# Patient Record
Sex: FEMALE | Race: BLACK OR AFRICAN AMERICAN | NOT HISPANIC OR LATINO | Employment: FULL TIME | URBAN - METROPOLITAN AREA
[De-identification: names, ages, dates, MRNs, and addresses within clinical notes are randomized per-mention and may not be internally consistent; named-entity substitution may affect disease eponyms.]

---

## 2020-02-25 PROBLEM — E66.01 MORBID OBESITY: Status: ACTIVE | Noted: 2017-11-02

## 2020-04-08 PROBLEM — Z79.4 TYPE 2 DIABETES MELLITUS WITH HYPERGLYCEMIA, WITH LONG-TERM CURRENT USE OF INSULIN: Status: ACTIVE | Noted: 2017-11-02

## 2020-04-08 PROBLEM — M79.661 PAIN AND SWELLING OF LOWER LEG, RIGHT: Status: ACTIVE | Noted: 2017-11-02

## 2020-04-08 PROBLEM — E11.65 TYPE 2 DIABETES MELLITUS WITH HYPERGLYCEMIA, WITH LONG-TERM CURRENT USE OF INSULIN: Status: ACTIVE | Noted: 2017-11-02

## 2020-04-08 PROBLEM — M79.89 PAIN AND SWELLING OF LOWER LEG, RIGHT: Status: ACTIVE | Noted: 2017-11-02

## 2020-04-08 PROBLEM — R76.8 RHEUMATOID FACTOR POSITIVE: Status: ACTIVE | Noted: 2017-11-02

## 2021-04-29 ENCOUNTER — PATIENT MESSAGE (OUTPATIENT)
Dept: RESEARCH | Facility: HOSPITAL | Age: 36
End: 2021-04-29

## 2021-07-29 DIAGNOSIS — U07.1 COVID-19 VIRUS DETECTED: ICD-10-CM

## 2021-08-01 ENCOUNTER — NURSE TRIAGE (OUTPATIENT)
Dept: ADMINISTRATIVE | Facility: CLINIC | Age: 36
End: 2021-08-01

## 2022-04-29 ENCOUNTER — OFFICE VISIT (OUTPATIENT)
Dept: ENDOCRINOLOGY | Facility: CLINIC | Age: 37
End: 2022-04-29
Payer: COMMERCIAL

## 2022-04-29 VITALS
DIASTOLIC BLOOD PRESSURE: 78 MMHG | BODY MASS INDEX: 48.82 KG/M2 | WEIGHT: 293 LBS | HEIGHT: 65 IN | SYSTOLIC BLOOD PRESSURE: 118 MMHG

## 2022-04-29 DIAGNOSIS — Z79.4 TYPE 2 DIABETES MELLITUS WITH HYPERGLYCEMIA, WITH LONG-TERM CURRENT USE OF INSULIN: ICD-10-CM

## 2022-04-29 DIAGNOSIS — E66.01 SEVERE OBESITY (BMI >= 40): ICD-10-CM

## 2022-04-29 DIAGNOSIS — E11.65 UNCONTROLLED TYPE 2 DIABETES MELLITUS WITH HYPERGLYCEMIA: Primary | ICD-10-CM

## 2022-04-29 DIAGNOSIS — E78.5 HYPERLIPIDEMIA, UNSPECIFIED HYPERLIPIDEMIA TYPE: ICD-10-CM

## 2022-04-29 DIAGNOSIS — E11.65 TYPE 2 DIABETES MELLITUS WITH HYPERGLYCEMIA, WITH LONG-TERM CURRENT USE OF INSULIN: ICD-10-CM

## 2022-04-29 DIAGNOSIS — I10 HYPERTENSION, ESSENTIAL: ICD-10-CM

## 2022-04-29 PROCEDURE — 1160F RVW MEDS BY RX/DR IN RCRD: CPT | Mod: CPTII,S$GLB,, | Performed by: GENERAL ACUTE CARE HOSPITAL

## 2022-04-29 PROCEDURE — 3008F BODY MASS INDEX DOCD: CPT | Mod: CPTII,S$GLB,, | Performed by: GENERAL ACUTE CARE HOSPITAL

## 2022-04-29 PROCEDURE — 3078F DIAST BP <80 MM HG: CPT | Mod: CPTII,S$GLB,, | Performed by: GENERAL ACUTE CARE HOSPITAL

## 2022-04-29 PROCEDURE — 99215 OFFICE O/P EST HI 40 MIN: CPT | Mod: PBBFAC,PO | Performed by: GENERAL ACUTE CARE HOSPITAL

## 2022-04-29 PROCEDURE — 99204 PR OFFICE/OUTPT VISIT, NEW, LEVL IV, 45-59 MIN: ICD-10-PCS | Mod: S$GLB,,, | Performed by: GENERAL ACUTE CARE HOSPITAL

## 2022-04-29 PROCEDURE — 3074F PR MOST RECENT SYSTOLIC BLOOD PRESSURE < 130 MM HG: ICD-10-PCS | Mod: CPTII,S$GLB,, | Performed by: GENERAL ACUTE CARE HOSPITAL

## 2022-04-29 PROCEDURE — 99999 PR PBB SHADOW E&M-EST. PATIENT-LVL V: ICD-10-PCS | Mod: PBBFAC,,, | Performed by: GENERAL ACUTE CARE HOSPITAL

## 2022-04-29 PROCEDURE — 3074F SYST BP LT 130 MM HG: CPT | Mod: CPTII,S$GLB,, | Performed by: GENERAL ACUTE CARE HOSPITAL

## 2022-04-29 PROCEDURE — 1159F MED LIST DOCD IN RCRD: CPT | Mod: CPTII,S$GLB,, | Performed by: GENERAL ACUTE CARE HOSPITAL

## 2022-04-29 PROCEDURE — 3078F PR MOST RECENT DIASTOLIC BLOOD PRESSURE < 80 MM HG: ICD-10-PCS | Mod: CPTII,S$GLB,, | Performed by: GENERAL ACUTE CARE HOSPITAL

## 2022-04-29 PROCEDURE — 99204 OFFICE O/P NEW MOD 45 MIN: CPT | Mod: S$GLB,,, | Performed by: GENERAL ACUTE CARE HOSPITAL

## 2022-04-29 PROCEDURE — 1160F PR REVIEW ALL MEDS BY PRESCRIBER/CLIN PHARMACIST DOCUMENTED: ICD-10-PCS | Mod: CPTII,S$GLB,, | Performed by: GENERAL ACUTE CARE HOSPITAL

## 2022-04-29 PROCEDURE — 99999 PR PBB SHADOW E&M-EST. PATIENT-LVL V: CPT | Mod: PBBFAC,,, | Performed by: GENERAL ACUTE CARE HOSPITAL

## 2022-04-29 PROCEDURE — 3008F PR BODY MASS INDEX (BMI) DOCUMENTED: ICD-10-PCS | Mod: CPTII,S$GLB,, | Performed by: GENERAL ACUTE CARE HOSPITAL

## 2022-04-29 PROCEDURE — 1159F PR MEDICATION LIST DOCUMENTED IN MEDICAL RECORD: ICD-10-PCS | Mod: CPTII,S$GLB,, | Performed by: GENERAL ACUTE CARE HOSPITAL

## 2022-04-29 RX ORDER — DAPAGLIFLOZIN 10 MG/1
10 TABLET, FILM COATED ORAL DAILY
Qty: 30 TABLET | Refills: 11 | Status: CANCELLED | OUTPATIENT
Start: 2022-04-29

## 2022-04-29 RX ORDER — DULAGLUTIDE 3 MG/.5ML
3 INJECTION, SOLUTION SUBCUTANEOUS
Qty: 4 PEN | Refills: 2 | Status: CANCELLED | OUTPATIENT
Start: 2022-04-29 | End: 2023-04-29

## 2022-04-29 RX ORDER — INSULIN LISPRO 100 [IU]/ML
12 INJECTION, SOLUTION INTRAVENOUS; SUBCUTANEOUS
Qty: 12 ML | Refills: 11 | Status: SHIPPED | OUTPATIENT
Start: 2022-04-29 | End: 2023-06-14 | Stop reason: SDUPTHER

## 2022-04-29 RX ORDER — IBUPROFEN 200 MG
16 TABLET ORAL
Qty: 50 TABLET | Refills: 12 | COMMUNITY
Start: 2022-04-29

## 2022-04-29 RX ORDER — INSULIN PUMP SYRINGE, 3 ML
EACH MISCELLANEOUS
Qty: 1 EACH | Refills: 0 | Status: SHIPPED | OUTPATIENT
Start: 2022-04-29 | End: 2023-08-16 | Stop reason: ALTCHOICE

## 2022-04-29 RX ORDER — DULAGLUTIDE 3 MG/.5ML
3 INJECTION, SOLUTION SUBCUTANEOUS
Qty: 4 PEN | Refills: 11 | Status: SHIPPED | OUTPATIENT
Start: 2022-04-29 | End: 2022-12-06

## 2022-04-29 RX ORDER — DEXAMETHASONE 1 MG/1
1 TABLET ORAL ONCE
Qty: 1 TABLET | Refills: 0 | Status: SHIPPED | OUTPATIENT
Start: 2022-04-29 | End: 2022-04-29

## 2022-04-29 NOTE — PROGRESS NOTES
Subjective:      Patient ID: Leticia Hyde is a 37 y.o. female.    Chief Complaint:  DM2; Initial visit     Patient Active Problem List   Diagnosis    Morbid obesity    Pain and swelling of lower leg, right    Rheumatoid factor positive    Type 2 diabetes mellitus with hyperglycemia, with long-term current use of insulin       History of Present Illness  38 YO Female w/ PMH as above that presents to the endocrine clinic for initial evaluation of DM2.  Pt today reports having uncontrolled DM despite not eating much carbohydrates.  Pt reports that at times she will forget her pills or the weekly trulicity but she tries to use her insulin every day. Pt reports having proximal muscle weakness.   Denies hypoglycemias.     With regards to Diabetes Mellitus:   -Type 2    -Duration:  Dx 2017 which she progressed to DM from Pre DM   -FH of DM?  Mother, grandparents,  Uncles, father  (Strong FH of DM)   -Microvascular complications: ( Neuropathy)   -Macrovascular complications:  (CAD, PVD, CVA)  -DKA?  DENIES   -HHS?  Yes on admission   -DM Medications:  Farxiga 5mg daily,  Trulicity 3mg SC weekly,  Januvia 100mg,  Tresiba U200 80 units at 2pm daily     -Previously tried medications:   Metformin (diarrhea)   -Compliance w/ Meds:    Misses truilicity and Farxiga at times   -Hyperglycemia symptoms: DENIES   -Hypoglycemia symptoms:  DENIES IN THE PAST 3 MONTHS   -Know how to correct hypoglycemias:  NO, Will do teaching today     -Glucose monitoring:   AM (120- 360)  HS (120 - 350)   -Diet:  Having high carb meals but in small portions.   -Activity:  Sedentary   -UTIs?  Yes, Has history of yeast infection and UTI  Prior to starting Farxiga   -Thyroid CA?  DENIES   -ETOH Abuse?  DENIES     Diabetes Management Status    Statin: Not taking  (Not indicated)   ACE/ARB: Not taking (Not indicated)     Screening or Prevention Patient's value Goal Complete/Controlled?   HgA1C Testing and Control   Lab Results   Component Value Date     HGBA1C 12.0 (H) 03/03/2022      Annually/Less than 8% No   Lipid profile : 11/18/2021 Annually Yes   LDL control Lab Results   Component Value Date    LDLCALC 113 (H) 11/18/2021    Annually/Less than 100 mg/dl  No   Nephropathy screening Lab Results   Component Value Date    LABMICR 8 03/15/2021     Lab Results   Component Value Date    PROTEINUA Negative 03/15/2021    Annually No   Blood pressure BP Readings from Last 1 Encounters:   03/31/22 124/80    Less than 140/90 Yes   Dilated retinal exam Most Recent Eye Exam Date: Not Found Annually Yes   Foot exam   Most Recent Foot Exam Date: Not Found Annually Yes        ROS:   As above    Objective:     There were no vitals taken for this visit.  BP Readings from Last 3 Encounters:   03/31/22 124/80   03/28/22 122/78   03/03/22 136/80     Wt Readings from Last 1 Encounters:   04/20/22 1121 129.5 kg (285 lb 9.6 oz)     There is no height or weight on file to calculate BMI.      Physical Exam  Vitals reviewed.   Constitutional:       General: She is not in acute distress.     Appearance: Normal appearance. She is well-developed. She is not ill-appearing.   HENT:      Nose: Nose normal. No rhinorrhea.      Mouth/Throat:      Mouth: Mucous membranes are moist.   Eyes:      Extraocular Movements: Extraocular movements intact.      Pupils: Pupils are equal, round, and reactive to light.      Comments: No proptosis, No lid lag, No conjunctival erythema    Neck:      Thyroid: No thyromegaly.      Trachea: No tracheal deviation.      Comments: No thyromegaly or Thyroid nodules palpated on exam   Cardiovascular:      Rate and Rhythm: Normal rate and regular rhythm.      Pulses: Normal pulses.   Pulmonary:      Effort: Pulmonary effort is normal.      Breath sounds: Normal breath sounds.   Abdominal:      Palpations: Abdomen is soft. There is no mass.      Tenderness: There is no abdominal tenderness.      Hernia: No hernia is present.      Comments: No scar tissue, No  Violaceous striae    Musculoskeletal:         General: No swelling.      Cervical back: Neck supple. No tenderness.      Right lower leg: No edema.      Left lower leg: No edema.   Skin:     General: Skin is warm.      Findings: No rash.   Neurological:      General: No focal deficit present.      Mental Status: She is alert and oriented to person, place, and time.   Psychiatric:         Mood and Affect: Mood normal.         Judgment: Judgment normal.                Lab Review:   Lab Results   Component Value Date    HGBA1C 12.0 (H) 03/03/2022     Lab Results   Component Value Date    CHOL 194 11/18/2021    HDL 66 11/18/2021    LDLCALC 113 (H) 11/18/2021    TRIG 83 11/18/2021     Lab Results   Component Value Date     03/03/2022    K 4.2 03/03/2022    CL 95 (L) 03/03/2022    CO2 23 03/03/2022     (H) 03/03/2022    BUN 7 03/03/2022    CREATININE 0.78 03/03/2022    CALCIUM 9.2 03/03/2022    PROT 6.8 07/14/2020    ALBUMIN 4.0 03/03/2022    BILITOT 0.2 03/03/2022    ALKPHOS 71 07/14/2020    AST 25 03/03/2022    ALT 21 03/03/2022    EGFRNONAA 115 11/18/2021    TSH 0.922 03/15/2021     No results found for: [id number]    Assessment and Plan     Uncontrolled type 2 diabetes mellitus with hyperglycemia  Type 2 diabetes mellitus with hyperglycemia, with long-term current use of insulin    Due to A1C above goal.  I will recommend the following.       DC Januvia as patient is taking Trulicity and should not be used in combination     Split Tresiba to 40 units BID for better absorption    Will start Humalog 12 untis TID before meals     C/w Farxiga 5mg daily   (Side effects of Euglycemic DKA, Dehydration and Yeast infections discussed)  If patient re develops Yeast infections will stop Farxiga     C/w Trulicity 3mg SC weekly   Will consider increasing at next visit     DM education     Keep glucose log to send for review     Will consider DEXCOM at next visit     Will monitor          Hypertension, essential  Bp  at goal with out medications   Low Na diet   Will monitor     Hyperlipidemia, unspecified hyperlipidemia type  Diet, exercise and lifestyle modifications discussed     NO indication for statin at this time     Will monitor     Severe obesity (BMI >= 40)  Diet, exercise and lifestyle modifications discussed     1mg DST  (Rule out Cushings)     C/w Trulicity 3mg SC weekly     Will consider bariatric surgery referral in the future if conservative measures fail

## 2022-04-29 NOTE — PATIENT INSTRUCTIONS
Due to your history of diabetes which seems to be uncontrolled at this time.  I will recommend the following.     STOP JANUVIA as we can not use together with Trulicity.     We will spit the Tresiba to 40 units twice a day for better absorbtion of insulin and hopefully improve your diabetes.     We will start a short acting insulin called Humalog 15 units (10 minutes before your meals. )  if you do not eat you do not use the Humalog because it may result in a low blood sugar.      Continue with Trulicity 3mg injection once a week.      Continue with Farxiga 5mg daily.  (Stay hydrated to avoid dehydration as this medication will make you urinate sugar)       Instructions for 1mg Dexamethasone suppression test.  (Rule out excess cortisol production from adrenal glands as a cause of your uncontrolled diabetes):      Take 1mg tablet at 11pm the night prior to having blood work (Cortisol levels)   Next morning go to the Lab to have Cortisol levels drawn at 8am  (Fasting)           150 grams of carbohydrates daily MAX,  (50 grams or less per meal)     Avoid sugary drinks (Sodas, Sweet Tea, Juices with added sugar, Soft drinks)     Check your sugars 3-4 times daily (Before meals and at bedtime)     Sugar goals before breakfast (70 - 130)  Sugars 2 hours after meals  (less than 180)     Keep sugar log for review at next visit.     Try walking or doing some sort of physical activity at least 30 minutes 3 times a week to increase your sensitivity to insulin, improve sugar levels and hopefully this will help in trying to reduce the doses of your medications in the future.     If having low blood sugar < 70 please correct with 16 grams of carbohydrates or with 4 glucose tablets and re-check your sugars every 15 minutes until symptoms resolve and sugar is above 100.      We will check your A1C and labs prior to next visit.     Ophthalmology appointment once a year.     Diabetes education appointment once a year.     If any  questions feel free to contact me.     Have a nice day.     Sincerely,       Ray Carrion MD   Endocrinology   4/29/2022 3:07 PM                  Eating the Right Number of Calories (0584-0257 Guidelines)    Calories are a measure of the energy you get from food. If you eat more calories than you use, you will gain weight. If you eat fewer calories than you use, you will lose weight. Below are tables that give the number of calories needed each day. Look for your gender, age, and activity level. If you stick to this number, you should neither gain nor lose weight. Note that this is an estimated number of calories.* Your exact number may differ.    Women  Age in years Low activity level (calories/day) Moderate activity level (calories/day) High activity level (calories/day)   19 to 30 1,800-2,000 2,000-2,200 2,400   31 to 50 1,800 2,000 2,200   51 and older 1,600 1,800 2,000-2,200      Men  Age in years Low activity level  (calories/day) Moderate activity level (calories/day) High activity level (calories/day)   19 to 30 2,400-2,600 2,600-2,800 3,000   31 to 50 2,200-2,400 2,400-2,600 2,800-3,000   51 and older 2,000-2,200 2,200-2,400 2,400-2,800     Activity levels defined  Low. Only light physical activity such as that done during typical daily life.  Moderate. Light physical activity done during typical daily life AND physical activity equal to walking about 1.5 to 3 miles a day at 3 to 4 miles per hour.  High. Light physical activity done during typical daily life AND physical activity equal to walking more than 3 miles a day at 3 to 4 miles per hour.  *From Dietary Guidelines for Americans, 1595-8897, [place] Department of Health and Human Services.    Eat less fat  A gram of fat has almost 2.5 times the calories of a gram of protein or carbohydrates. Try to balance your food choices so that only 20% to 35% of your calories comes from total fat. This means an average of 2½ to 3½ grams of fat for each 100  calories you eat.    Eat more fiber  High-fiber foods are digested more slowly than low-fiber foods, so you feel full longer. Try to get at least 25 grams of fiber each day for a 2000 calorie diet. Foods high in fiber include:  Vegetables and fruits  Whole-grain or bran breads, pastas, and cereals  Legumes (beans) and peas  As you begin to eat more fiber, be sure to drink plenty of water to keep your digestive system working smoothly.    Tips  Do's and don'ts include:   Dont skip meals. This often leads to overeating later on. Its best to spread your eating throughout the day.  Eat a variety of foods, not just a few favorites.  If you find yourself eating when youre not hungry, ask yourself why. Many of us eat when were bored, stressed, or just to be polite. Listen to your body. If youre not hungry, get busy doing something else instead of eating.  Eat slower, shooting for 20 to 30 minutes for each meal. It takes 20 minutes for your stomach to tell your brain that its full. Slow eaters tend to eat less and are still satisfied, while fast eaters may tend to be overeaters.   Pay attention to what you eat. Dont read or watch TV during your meal.     ---------------------------------------------------------------------------------------------------------------------------------------------------    Losing Weight for Heart Health  Excess weight is a major risk factor for heart disease. Losing weight has many benefits including lowering your blood pressure, improving your cholesterol level, and decreasing your risk for diseases such as diabetes and heart disease. It may help keep your arteries open so that your heart can get the oxygen-rich blood it needs. All in all, losing weight makes you healthier.       Exercise with a friend. When activity is fun, you're more likely to stick with it.     Calories and weight loss  Calories are the fuel your body burns for energy. You get the calories you need from the food you  eat. For healthy weight loss, women should eat at least 1,200 calories a day, men at least 1,500.  When you eat more calories than you need, your body stores the extra calories as fat. One pound of fat equals 3,500 calories.  To lose weight, try to reduce your total calorie intake by 500 calories. To do this, eat 250 calories less each day. Add activity to burn the other 250 calories. Walking 2.5 miles burns about 250 calories. Other more intense activities can burn more calories in the time you spend doing them, such as swimming and running. It is important to understand that reducing calorie intake is much more effective at weight loss than is exercise.  Eat a variety of healthy foods to get the nutrients you need.    Tips for losing weight  Drink 8 to 10 glasses of water a day.  Dont skip meals. Instead, eat smaller portions.  Eat your meals earlier in the day.  Cut out sugary drinks such as soda and fruit juices.  Make your later meals lighter than your earlier meals.     What can exercise help?  Blood sugar. Regular exercise improves blood sugar control by helping your body use insulin.  Mental and emotional health. Physical activity relieves stress and helps you sleep better.  Heart health. With regular exercise, you can reduce your risk of heart disease and high blood pressure. You can also improve your cholesterol and triglyceride levels.  Weight. Exercise helps you lose fat, gain muscle, and control your weight.  Health of blood vessels and nerves. Activity helps lower blood sugar. This helps prevent damage to blood vessels and nerves that can cause problems with your brain, eyes, feet, and legs.  Finances. If you manage your blood sugar, you may spend less on medical care.    2 types of exercise  Two types of exercise help your body use blood sugar. Experts advise both types of exercise for people with diabetes:  Aerobic exercise. This is a rhythmic, repeated, continued movement of large muscle groups for  at least 10 minutes at a time. You should do this about 30 minutes a day on most days of the week. Examples include walking, bicycling, jogging, swimming, water aerobics, and many sports.  Resistance exercise (strength training). This type of exercise uses muscles to move weight or work against resistance. You can do it with free weights, machines, resistance tubing, or your own body weight. Adults with diabetes should aim for 2 to 3 sessions of resistance exercise each week. Its best to skip a day in between.    A goal to shoot for  Your main goal is to become more active. Even a little bit helps. Choose an activity that you like. Walking is one great form of exercise that everyone can do. Talk to your healthcare provider about any limits you may have before starting with an exercise program. Then aim for 150 minutes a week of physical activity. Dont let more than 2 days go by without exercise. When you are sitting for long periods of time, get up for short sessions of light activity every 30 minutes.    Getting activity into your day  Being more active doesnt have to be hard work. Try these to get more activity into your day:  Take the stairs instead of the elevator  Garden, do housework, and yard work  Choose a parking space farther from the store  Walk to talk to a co-worker instead of calling  Take a 10-minute walk around the block at lunch  Walk to a bus stop a little farther from your home or office  Walk the dog after dinner     ---------------------------------------------------------------------------------------------------------------------------------------------------    Reading Food Labels  Look for the Nutrition Facts label on packaged foods. Reading labels is a big step toward eating healthier. The tips below help you know what to look for.    Serving size. Read this closely because the package, jar, or can may contain more than 1 serving. This is how to measure 1 serving of the food in the  package. If you eat more than 1 serving, you get more of everything on the label -- including fat, cholesterol, and calories.  Total fat. This tells you how many grams (g) of fat are in 1 serving. Fat is high in calories. A healthy goal is to have less than 25% of your daily calories come from fat.  Saturated fat. This tells you how much saturated fat is in 1 serving. Saturated fat raises your cholesterol the most. Look for foods that have little or no saturated fat.  Trans fat. This tells you how much trans fat is in 1 serving. Even a small amount of trans fat can harm your health. Choose foods that have no trans fat.  Cholesterol. This tells you how much cholesterol is in 1 serving. For many years, it had been recommended to eat less than 300 milligrams (mg) of cholesterol a day. New guidelines have removed this limitation as cholesterol has been recently shown to not raise blood cholesterol levels as significantly as previously thought. However, many foods high in cholesterol are also high in saturated fat. It is recommended to limit saturated fat in your diet.  Calories from fat. This number tells you how many calories from fat are in 1 serving (there are 9 calories per gram of fat). Look for foods with few calories from fat.  % Daily value. The higher the number, the more 1 serving has of that nutrient. Look for foods that have low numbers for total fat, saturated fat, cholesterol, and sodium.  Sodium. This tells you how much sodium (salt) is in 1 serving. Choose foods with low numbers for sodium.  Dietary fiber. This number tells you how much fiber is in 1 serving. Foods that are high in fiber can help you feel full. They can also be good for your heart and digestion. The recommended daily amount of fiber is 25 grams for women and 38 grams for men. After age 50, your daily fiber needs drop to 21 grams for women and 30 grams for men.        ---------------------------------------------------------------------------------------------------------------------------------------------------    Understanding Carbohydrates, Fats, and Protein  Food is a source of fuel and nourishment for your body. Its also a source of pleasure. Having diabetes doesnt mean you have to eat special foods or give up desserts. Instead, your dietitian can show you how to plan meals to suit your body. To start, learn how different foods affect blood sugar.    Carbohydrates  Carbohydrates are the main source of fuel for the body. Carbohydrates raise blood sugar. Many people think carbohydrates are only found in pasta or bread. But carbohydrates are actually in many kinds of foods:  Sugars occur naturally in foods such as fruit, milk, honey, and molasses. Sugars can also be added to many foods, from cereals and yogurt to candy and desserts. Sugars raise blood sugar.  Starches are found in bread, cereals, pasta, and dried beans. Theyre also found in corn, peas, potatoes, yam, acorn squash, and butternut squash. Starches also raise blood sugar.   Fiber is found in foods such as vegetables, fruits, beans, and whole grains. Unlike other carbs, fiber isnt digested or absorbed. So it doesnt raise blood sugar. In fact, fiber can help keep blood sugar from rising too fast. It also helps keep blood cholesterol at a healthy level.  Did you know?  Even though carbohydrates raise blood sugar, its best to have some in every meal. They are an important part of a healthy diet.     Fat  Fat is an energy source that can be stored until needed. Fat does not raise blood sugar. However, it can raise blood cholesterol, increasing the risk of heart disease. Fat is also high in calories, which can cause weight gain. Not all types of fat are the same.    More Healthy:  Monounsaturated fats are mostly found in vegetable oils, such as olive, canola, and peanut oils. They are also found in avocados and  some nuts. Monounsaturated fats are healthy for your heart. Thats because they lower LDL (unhealthy) cholesterol.  Polyunsaturated fats are mostly found in vegetable oils, such as corn, safflower, and soybean oils. They are also found in some seeds, nuts, and fish. Polyunsaturated fats lower LDL (unhealthy) cholesterol. So, choosing them instead of saturated fats is healthy for your heart. Certain unsaturated fats can help lower triglycerides.     Less Healthy:  Saturated fats are found in animal products, such as meat, poultry, whole milk, lard, and butter. Saturated fats raise LDL cholesterol and are not healthy for your heart.  Hydrogenated oils and trans fats are formed when vegetable oils are processed into solid fats. They are found in many processed foods. Hydrogenated oils and trans fats raise LDL cholesterol and lower HDL (healthy) cholesterol. They are not healthy for your heart.    Protein  Protein helps the body build and repair muscle and other tissue. Protein has little or no effect on blood sugar. However, many foods that contain protein also contain saturated fat. By choosing low-fat protein sources, you can get the benefits of protein without the extra fat:  Plant protein is found in dry beans and peas, nuts, and soy products, such as tofu and soymilk. These sources tend to be cholesterol-free and low in saturated fat.  Animal protein is found in fish, poultry, meat, cheese, milk, and eggs. These contain cholesterol and can be high in saturated fat. Aim for lean, lower-fat choices.    ---------------------------------------------------------------------------------------------------------------------------------------------------    Understanding Carbohydrates    A car needs the right type of fuel to run. And you need the right kind of food to function. To keep your energy level up, your body needs food that has carbohydrates. But carbohydrates raise blood sugar levels higher and faster than other  kinds of food. Your dietitian will work with you to figure out the amount of carbohydrates you need.    Starches  Starches are found in grains, some vegetables, and beans. Grain products include bread, pasta, cereal, and tortillas. Starchy vegetables include potatoes, peas, corn, lima beans, yams, and squash. Kidney beans, nichols beans, black beans, garbanzo beans, and lentils also contain starches.    Sugars  Sugars are found naturally in many foods. Or sugar can be added. Foods that contain natural sugar include fruits and fruit juices, dairy products, honey, and molasses. Added sugars are found in most desserts, processed foods, candy, regular soda, and fruit drinks. These are very helpful for treating low blood sugar, or hypoglycemia. They provide sugar quickly. Try to keep at least 15 to 20 grams of these simple sugars with you at all times. Eat this if you begin to have low blood sugar symptoms.    Fiber  Fiber comes from plant foods. Most fiber isnt digested by the body. Instead of raising blood sugar levels like other carbohydrates, it actually stops blood sugar from rising too fast. Fiber is found in fruits, vegetables, whole grains, beans, peas, and many nuts.    Carb counting  Keep track of the amount of carbohydrates you eat. This can help you keep the right balance of physical activity and medicine. The amount of carbohydrates needed will vary for each person. It depends on many things such as your health, the medicines you take, and how active you are. Your healthcare team will help you figure out the right amount of carbohydrates for you. You may start with around 45 to 60 grams of carbohydrate per meal, depending on your situation. Carb counting is a system that helps you keep track of the carbohydrates you eat at each meal.  Carbohydrates come from a variety of foods. These include grains, starchy vegetables, fruit, milk, beans, and snack foods. You can either count carbohydrate grams or  carbohydrate servings. When you count carbohydrate servings, 1 carbohydrate serving = 15 grams of carbohydrates.  Here are some examples of foods containing about 15 grams of carbohydrates (1 serving of carbohydrates):  1/2 cup of canned or frozen fruit  A small piece of fresh fruit (4 ounces)  1 slice of bread  1/2 cup of oatmeal  1/3 cup of rice  4 to 6 crackers  1/2 English muffin  1/2 cup of black beans  1/4 of a large baked potato (3 ounces)  2/3 cup of plain fat-free yogurt  1 cup of soup  1/2 cup of casserole  6 chicken nuggets  2-inch-square brownie or cake without frosting  2 small cookies  1/2 cup of ice cream or sherbet    Carb counting is easier when food labels are available. Look at the label to see how many grams of total carbohydrates the food contains. Then you can figure out how much you should eat.  Two very important lines to look at on the label are the serving size and the total carbohydrate amount. Here are some tips for using food labels to count your carbohydrate intake:  Check the serving size. The information on the label is based on that serving size. If you eat more than the listed serving size, you may have to double or triple the other information on the label.   Check the total grams of carbohydrates. Total carbohydrate from the label includes sugar, starch, and fiber. Be sure to use the total carbohydrate number and not sugar alone.  Know how many grams of carbohydrates you can have.  Be familiar with the matching portion sizes.  Compare labels. Compare the labels of different products, looking at serving sizes and total carbohydrates to find the products that work best for you.   Don't forget protein and fat. With all the focus on carb counting, it might be easy to forget protein and fat in your meals. Don't forget to include sources of protein and healthy fat to balance your meals.  Its also important to be consistent with the amount and time you eat when taking a fixed dose of  diabetes medicine. Work with your healthcare provider or dietitian if you need additional help. He or she can help you keep track of your carbohydrate intake. He or she can also help you figure out how many grams of carbohydrates you should have.      ---------------------------------------------------------------------------------------------------------------------------------------------------    Diabetes: Learning About Serving and Portion Sizes     A good rule of thumb: Devote half your plate to vegetables and green salad. Split the other half between protein and starchy carbohydrates. Fruit makes a good dessert.     Servings and portions. Whats the difference? These terms can be very confusing. But learning to measure serving sizes can help you figure out how many carbohydrates (carbs) and other foods you eat each day. They are also powerful tools for managing your weight.    Servings and portions  Many different words are used to describe amounts of food. If your health care provider uses a term youre not sure of, dont be afraid to ask. It helps to know the difference between servings and portions:  A serving size is a fixed size. Food producers use this term to describe their products. For example, the label on a cereal box could say that 1 cup of dry cereal = 1 serving.  A portion (also called a helping) is how much you eat or how much you put on your plate at a meal. For example, you might eat 2 cups of cereal at breakfast.    Using serving information  The portion you choose to eat (such as 2 cups of cereal) may be more than one serving as listed on the food label (such as 1 cup of cereal). Thats why it helps to measure or weigh the food you eat. Because the food label values are based on servings, youll need to know how many servings you eat at one sitting.     Ounces: 2 to 3 ounces is about the size of your palm.       1 Cup: 1 cup (or a medium-sized piece) is about the size of your fist.      "  1/2 Cup: 1/2 cup is about the size of your cupped hand.      One tablespoon is about the size of your thumb.  One teaspoon is about the size of the tip of your thumb.    Keeping track of serving sizes  When youre planning for a snack or a meal, keep servings in mind. If you dont have measuring cups or a scale handy, there are ways to eyeball serving sizes, such as comparing your food to the size of your hand (see pictures above).    Managing portion sizes  If your weight is a concern, reducing your portions can help. You can eat more than one serving of a food at once. But to keep from eating too much at one meal, learn how to manage your portions. A portion is the amount of each type of food on your plate. See the plate diagram for an example of balanced portions.    ---------------------------------------------------------------------------------------------------------------------------------------------------    Diabetes: Shopping for and Preparing Meals    Having diabetes doesnt mean you have to shop in a special aisle or look for special foods. But you will need to make choices. By comparing items and reading food labels, you can find the healthiest foods for you and your family.  Comparing items  When you shop, compare items to find the best ones for your needs. Keep these facts in mind:  No sugar added does not mean a product is sugar-free.  "Sugar-free" means less than 1/2 gram (g) of sugar per serving.  Fat free means less than 1/2 g of fat per serving. This does not necessarily mean the product is low in calories.  Low fat means 3 g fat or less per serving. Reduced fat or less fat means 25% less fat than the regular version. Some of this fat may be saturated or trans fat. And calories per serving may be similar to the regular version.    Making small changes  Dont try to change all of your eating habits at once. Here are some ideas to start with:  Try fat-free or low-fat cheese, milk, " and yogurt. Also try leaner cuts of meat. This will help you cut down on saturated fat.  Try whole-grain breads, brown rice, and whole-wheat pasta.  Load up on fresh or frozen vegetables. If you buy canned, choose low-sodium vegetables.  Avoid processed foods as much as possible. They tend to be low in fiber and high in trans fats and sodium.  Try tofu, soymilk, or meat substitutes.?They can help you cut cholesterol and saturated fat out of your diet.    Learning to read food labels  To find healthy foods that help you control blood sugar, learn how to read food labels. Look for the Nutrition Facts label on packaged foods. It will tell you how much carbohydrate, sugar, fat, and fiber is in each serving. Then, you can decide whether or not the food fits into your meal plan.    Using the food label  So, once you have the food label, what do you do with it? The food label helps in many ways. Use it to:  Compare items and decide which is the best for your health needs.  Track the number of carbohydrates in your portions.  Figure out how many servings of a food you can have and still stay within the number of carbohydrates for that meal.    Planning meals  For good blood sugar control, plan what and when youll eat. Start by creating a meal plan that includes all the food groups. Then, time your meals to help keep your blood sugar level steady. You may need to adjust your plan for special situations.    Eat from all the food groups  The basis of a healthy meal plan is variety (eating many different types of foods). Look for lean meats, fresh fruits and vegetables, whole grains, and low-fat or non-fat dairy products. Eating a wide variety of foods provides the nutrients your body needs. It can also keep you from getting bored with your meal plan.    Reduce liquid sugars  Extra calories from sodas, sports drinks, and fruit drinks make it hard to keep blood sugar in range. Cut as many liquid sugars from your meal plan as  you can. This includes most fruit juices, which are often high in natural or added sugar. Instead, drink plenty of water and other sugar-free beverages.    Eat less fat  If you need to lose some weight, try to reduce the amount of fat in your diet. This can also help lower your cholesterol level to keep blood vessels healthier. Cut fat by using only small amounts of liquid oil for cooking. Read food labels carefully to avoid foods with unhealthy trans fats.    Timing your meals  When it comes to blood sugar control, when you eat is as important as what you eat. You may need to eat several small meals spaced evenly throughout the day to stay in your target range. So dont skip breakfast or wait until late in the day to get most of your calories. Doing so can cause your blood sugar to rise too high or fall too low.    Cooking wisely  Broil, steam, bake, or grill meats and vegetables, instead of frying.  Instead of cream-based sauces or sugary glazes, flavor foods with vegetable purée, lemon or lime juice, or herb seasonings.  Remove skin from chicken and turkey before serving.  Look in cookbooks for easy, low-fat, low-sugar recipes. When making your usual recipes, cut sugar by 1/2 and fat by 1/3.      ---------------------------------------------------------------------------------------------------------------------------------------------------    Healthy Meals for Diabetes    Ask your healthcare team to help you make a meal plan that fits your needs. Your meal plan tells you when to eat your meals and snacks, what kinds of foods to eat, and how much of each food to eat. You dont have to give up all the foods you like. But you do need to follow some guidelines.  Choose healthy carbohydrates  Starches, sugars, and fiber are all types of carbohydrates. Fiber can help lower your cholesterol and triglycerides. Fiber is also healthy for your heart. You should have 20 to 35 grams of total fiber each day. Fiber-rich foods  include:  Whole-grain breads and cereals  Bulgur wheat  Brown rice     Whole-wheat pasta  Fruits and vegetables  Dry beans, and peas   Keep track of the amount of carbohydrates you eat. This can help you keep the right balance of physical activity and medicine. The amount of carbohydrates needed will vary for each person. It depends on many things such as your health, the medicines you take, and how active you are. Your healthcare team will help you figure out the right amount of carbohydrates for you. You may start with around 45 to 60 grams of carbohydrates per meal, depending on your situation.   Here are some examples of foods containing about 15 grams of carbohydrates (1 serving of carbohydrates):  1/2 cup of canned or frozen fruit  A small piece of fresh fruit (4 ounces)  1 slice of bread  1/2 cup of oatmeal  1/3 cup of rice  4 to 6 crackers  1/2 English muffin  1/2 cup of black beans  1/4 of a large baked potato (3 ounces)  2/3 cup of plain fat-free yogurt  1 cup of soup  1/2 cup of casserole  6 chicken nuggets  2-inch-square brownie or cake without frosting  2 small cookies  1/2 cup of ice cream or sherbet    Choose healthy protein foods  Eating protein that is low in fat can help you control your weight. It also helps keep your heart healthy. Low-fat protein foods include:  Fish  Plant proteins, such as dry beans and peas, nuts, and soy products like tofu and soymilk  Lean meat with all visible fat removed  Poultry with the skin removed  Low-fat or nonfat milk, cheese, and yogurt    Limit unhealthy fats and sugar  Saturated and trans fats are unhealthy for your heart. They raise LDL (bad) cholesterol. Fat is also high in calories, so it can make you gain weight. To cut down on unhealthy fats and sugar, limit these foods:  Butter or margarine  Palm and palm kernel oils and coconut oil  Cream  Cheese  Stark  Lunch meats     Ice cream  Sweet bakery goods such as pies, muffins, and donuts  Jams and  jellies  Candy bars  Regular sodas     How much to eat  The amount of food you eat affects your blood sugar. It also affects your weight. Your healthcare team will tell you how much of each type of food you should eat.  Use measuring cups and spoons and a food scale to measure serving sizes.  Learn what a correct serving size looks like on your plate. This will help when you are away from home and cant measure your servings.  Eat only the number of servings given on your meal plan for each food. Dont take seconds.  Learn to read food labels. Be sure to look at serving size, total carbohydrates, fiber, calories, sugar, and saturated and trans fats. Look for healthier alternatives to foods that have added sugar.  Plan ahead for parties so you can still have a good time without going overboard with unhealthy food choices. Set a good example yourself by bringing a healthy dish to pot lucks.     Choose healthy snacks  When it comes to snacks, we usually think about foods with added sugar and fats. But there are many other options for healthier snack choices. Here are a few snack ideas to choose from:  Snacks with less than 5 grams of carbohydrates  1 piece of string cheese  3 celery sticks plus 1 tablespoon of peanut butter  5 cherry tomatoes plus 1 tablespoon of ranch dressing  1 hard-boiled egg  1/4 cup of fresh blueberries   5 baby carrots  1 cup of light popcorn  1/2 cup of sugar-free gelatin  15 almonds  Snacks with about 10 to 20 grams of carbohydrates  1/3 cup of hummus plus 1 cup of fresh cut nonstarchy vegetables (carrots, green peppers, broccoli, celery, or a combination)  1/2 cup of fresh or canned fruit plus 1/4 cup of cottage cheese  1/2 cup of tuna salad with 4 crackers  2 rice cakes and a tablespoon of peanut butter  1 small apple or orange  3 cups light popcorn  1/2 of a turkey sandwich (1 slice of whole-wheat bread, 2 ounces of turkey, and mustard)  Portion sizes are important to controlling your blood  sugar and staying at a healthy weight. Stock up on healthy snack items so you always have them on hand.    When to eat  Your meal plan will likely include breakfast, lunch, dinner, and some snacks.  Try to eat your meals and snacks at about the same times each day.  Eat all your meals and snacks. Skipping a meal or snack can make your blood sugar drop too low. It can also cause you to eat too much at the next meal or snack. Then your blood sugar could get too high.    ---------------------------------------------------------------------------------------------------------------------------------------------------    Eating Out When You Have Diabetes  Eating right is an important part of keeping your blood sugar in your target range. You just need to make healthy choices.    Be creative when eating out. Many places dont make you stick strictly to the menu. Instead of ordering a large entree, choose an appetizer or two and a bowl of soup. A mix of side orders can also make a good meal. Read the descriptions of other entrees and specials. If another entree comes with baby carrots, ask for a side order of them even if they dont come with your entree. Ask how food is prepared or if it can be made differently.  Tips for making the most of your meal out  Ask to have high-fat, high-calorie extras, such as French fries and potato chips, left off your plate so you wont be tempted.   Ask what substitutions are available. Instead of French fries, you may be able to have a side of salad with low-calorie dressing.  Order low-fat milk instead of cream for your coffee.  Ask for vegetables and main courses to be served without sauces, butter, margarine, or oil.  Be aware of portion size. You dont have to clean your plate. Take half your meal home in a doggie bag to eat the next day.  Look for heart-healthy or low-fat entrees that include whole grains, vegetables, or fruits.  Choose foods that are grilled, broiled, or  steamed.  Avoid dishes that are described on the menu as fried, breaded, smothered, rich, or creamy.    Tips for restaurant meals  When you eat away from home try these tips:  Try to schedule your dining-out meal at your normal meal time. Make a reservation if possible, so you don't have to wait to eat. If you can't make a reservation, try to arrive at the restaurant at a less-busy time to cut down your wait time. Eat a small fruit or starch snack at your regular mealtime if your restaurant meal is going to be later than usual.   Call ahead to see if the restaurant can meet your dietary needs if you've never been there before. Or you can go online to see the menu ahead of time.  Carry some crackers with you in case the restaurant needs you to wait until you can be served.  Ask how foods are prepared before you order.  Instead of fried, sautéed, or breaded foods, choose ones that are broiled, steamed, grilled, or baked.  Ask for sauces, gravies, and dressings on the side.  Only eat an amount that fits your meal plan. Remember: You can take home the leftovers.  Save dessert for special occasions. Then choose a small dessert or share one with a friend or family member.    Make healthy choices  Fast food  Garden salad with light dressing on the side  Baked potato with vegetables or herbs  Broiled, roasted, or grilled chicken sandwich  Sliced turkey or lean roast beef sandwich    Mexican  Chicken enchilada, without cheese or sour cream   Small burrito with whole beans and chicken  Whole beans (not refried) and rice  Chicken or fish fajitas    Steakhouse  Grilled or broiled lean cuts of beef  Baked potato with vegetables or herbs  Broiled or baked chicken. Dont eat the skin.  Steamed vegetables    Asian  Steamed dumplings or potstickers  Broiled, boiled, or steamed meats or fish  Sushi or sashimi  Steamed rice or boiled noodles. One serving is equal to 1/3 cup.      © 5035-2687 [employer]. [address] [place] [address]  [place] [address]. All rights reserved. This information is not intended as a substitute for professional medical care. Always follow your healthcare professional's instructions.

## 2022-05-18 ENCOUNTER — TELEPHONE (OUTPATIENT)
Dept: ENDOCRINOLOGY | Facility: CLINIC | Age: 37
End: 2022-05-18
Payer: MEDICAID

## 2022-05-18 DIAGNOSIS — E66.01 SEVERE OBESITY (BMI >= 40): Primary | ICD-10-CM

## 2022-05-18 RX ORDER — DEXAMETHASONE 1 MG/1
1 TABLET ORAL ONCE
Qty: 1 TABLET | Refills: 0 | Status: SHIPPED | OUTPATIENT
Start: 2022-05-18 | End: 2022-05-18

## 2022-05-18 NOTE — TELEPHONE ENCOUNTER
Spoke to the pt to make her 2 mo appointment and labs. Pt also said   ----- Message from Kellie Watson sent at 5/18/2022  8:24 AM CDT -----  Regarding: appt/rx  Contact: [phone]  Patient is requesting a call back regarding scheduling an appointment. She would also like to get a new rx for the pill she takes before her lab work.   Would the patient rather a call back or a response via [facility]?  Call   Best Call Back Number:  [phone]  Additional Information:

## 2022-06-01 ENCOUNTER — TELEPHONE (OUTPATIENT)
Dept: ENDOCRINOLOGY | Facility: CLINIC | Age: 37
End: 2022-06-01
Payer: MEDICAID

## 2022-06-01 DIAGNOSIS — E66.01 SEVERE OBESITY (BMI >= 40): Primary | ICD-10-CM

## 2022-06-01 RX ORDER — DEXAMETHASONE 1 MG/1
1 TABLET ORAL ONCE
Qty: 1 TABLET | Refills: 0 | Status: SHIPPED | OUTPATIENT
Start: 2022-06-01 | End: 2022-06-01

## 2022-06-01 NOTE — TELEPHONE ENCOUNTER
----- Message from Deni Fonseca MA sent at 6/1/2022 10:12 AM CDT -----  Regarding: FW: call back  Contact: [phone]  Can we send over another Dexamethasone pill to pt please she took it last night but wasn't on time to have the blood work drawn   ----- Message -----  From: Rachel Hernández  Sent: 6/1/2022  10:06 AM CDT  To: [facility] Staff  Subject: call back                                        Who Called: PT     Patient is calling to talk to nurse in regards to her labs would need to get orders to be sent to [facility] please fax to [phone], patient is there know at the lab and needs her orders. Please advice

## 2022-06-02 ENCOUNTER — TELEPHONE (OUTPATIENT)
Dept: ENDOCRINOLOGY | Facility: CLINIC | Age: 37
End: 2022-06-02
Payer: MEDICAID

## 2022-06-02 DIAGNOSIS — E66.01 SEVERE OBESITY (BMI >= 40): Primary | ICD-10-CM

## 2022-06-02 NOTE — TELEPHONE ENCOUNTER
Talked to Ms. Murphy with [facility] trying to make sure they had everything for the patient to get her labs done this morning. I did fax off the patient labs and she did receive it .

## 2022-06-02 NOTE — TELEPHONE ENCOUNTER
----- Message from Luke Kelly sent at 6/2/2022  8:07 AM CDT -----  Contact: pt  Type:  Patient Returning Call    Who Called:Pt   Would the patient rather a call back or a response via [facility]? Call   Best Call Back Number:[phone]  Additional Information:     Checking on the status of labs   Pt stated she spoke to someone on yesterday regarding this  Pt would like a call back

## 2022-06-03 LAB
ALBUMIN/CREAT UR: 10 MG/G CREAT (ref 0–29)
CORTIS AM PEAK SERPL-MCNC: 0.3 UG/DL (ref 6.2–19.4)
CREAT UR-MCNC: 88.1 MG/DL
MICROALBUMIN UR-MCNC: 9.2 UG/ML
TSH SERPL DL<=0.005 MIU/L-ACNC: 1.06 UIU/ML (ref 0.45–4.5)

## 2022-06-10 ENCOUNTER — TELEPHONE (OUTPATIENT)
Dept: ENDOCRINOLOGY | Facility: CLINIC | Age: 37
End: 2022-06-10
Payer: MEDICAID

## 2022-12-07 PROBLEM — I63.9 CEREBROVASCULAR ACCIDENT: Status: ACTIVE | Noted: 2021-12-15

## 2022-12-07 PROBLEM — R90.89 ABNORMAL IMAGING OF CENTRAL NERVOUS SYSTEM: Status: ACTIVE | Noted: 2021-11-03

## 2022-12-07 PROBLEM — G43.709 CHRONIC MIGRAINE WITHOUT AURA: Status: ACTIVE | Noted: 2021-10-12

## 2022-12-07 PROBLEM — G57.40 TIBIAL NEUROPATHY: Status: ACTIVE | Noted: 2020-08-25

## 2022-12-07 PROBLEM — M54.17 LUMBOSACRAL RADICULOPATHY: Status: ACTIVE | Noted: 2020-07-20

## 2022-12-07 PROBLEM — M47.817 LUMBOSACRAL SPONDYLOSIS WITHOUT MYELOPATHY: Status: ACTIVE | Noted: 2020-07-20

## 2022-12-07 PROBLEM — G57.31 DEEP PERONEAL NEUROPATHY OF RIGHT LOWER EXTREMITY: Status: ACTIVE | Noted: 2020-08-25

## 2022-12-07 PROBLEM — I80.209 THROMBOPHLEBITIS OF DEEP VEINS OF LOWER EXTREMITY: Status: ACTIVE | Noted: 2020-07-20

## 2022-12-07 PROBLEM — Z79.01 LONG TERM (CURRENT) USE OF ANTICOAGULANTS: Status: ACTIVE | Noted: 2021-10-12

## 2023-02-06 PROBLEM — R78.79 HIGH BLOOD COPPER LEVEL: Status: ACTIVE | Noted: 2023-01-17

## 2023-02-06 PROBLEM — G47.33 OSA (OBSTRUCTIVE SLEEP APNEA): Status: ACTIVE | Noted: 2023-01-11

## 2023-02-06 PROBLEM — R79.89 LOW SERUM PREALBUMIN: Status: ACTIVE | Noted: 2023-01-17

## 2023-02-06 PROBLEM — E61.1 IRON DEFICIENCY: Status: ACTIVE | Noted: 2023-01-17

## 2023-02-06 PROBLEM — Z86.718 H/O DEEP VENOUS THROMBOSIS: Status: ACTIVE | Noted: 2023-01-11

## 2023-02-06 PROBLEM — R79.82 CRP ELEVATED: Status: ACTIVE | Noted: 2023-01-17

## 2023-02-06 PROBLEM — R79.89 ELEVATED PARATHYROID HORMONE: Status: ACTIVE | Noted: 2023-01-17

## 2023-02-06 PROBLEM — K21.9 GASTROESOPHAGEAL REFLUX DISEASE WITHOUT ESOPHAGITIS: Status: ACTIVE | Noted: 2023-01-11

## 2023-02-06 PROBLEM — Z86.73 H/O: CVA (CEREBROVASCULAR ACCIDENT): Status: ACTIVE | Noted: 2023-01-11

## 2023-02-06 PROBLEM — E55.9 VITAMIN D DEFICIENCY: Status: ACTIVE | Noted: 2023-01-17

## 2023-03-13 PROBLEM — I63.9 CEREBROVASCULAR ACCIDENT: Status: RESOLVED | Noted: 2021-12-15 | Resolved: 2023-03-13

## 2023-09-25 LAB — HBA1C MFR BLD: 6.5 % (ref 4–6)

## 2024-01-03 ENCOUNTER — PATIENT OUTREACH (OUTPATIENT)
Dept: ADMINISTRATIVE | Facility: HOSPITAL | Age: 39
End: 2024-01-03
Payer: COMMERCIAL

## 2024-03-11 ENCOUNTER — TELEPHONE (OUTPATIENT)
Dept: PEDIATRICS | Facility: CLINIC | Age: 39
End: 2024-03-11
Payer: COMMERCIAL

## 2024-03-11 NOTE — TELEPHONE ENCOUNTER
GALO----- Message from Cate Mcdermott MA sent at 3/11/2024  1:09 PM CDT -----  Regarding: yellow fever vaccine  Contact: self  Pt wants an appointment for yellow fever vaccine. Is travelling April 16th to April 25th to [facility].    Self: [phone]

## 2024-03-20 ENCOUNTER — OFFICE VISIT (OUTPATIENT)
Dept: PEDIATRICS | Facility: CLINIC | Age: 39
End: 2024-03-20
Payer: COMMERCIAL

## 2024-03-20 VITALS
SYSTOLIC BLOOD PRESSURE: 124 MMHG | DIASTOLIC BLOOD PRESSURE: 80 MMHG | WEIGHT: 278.81 LBS | BODY MASS INDEX: 46.45 KG/M2 | HEART RATE: 91 BPM | TEMPERATURE: 98 F | HEIGHT: 65 IN

## 2024-03-20 DIAGNOSIS — Z71.84 TRAVEL ADVICE ENCOUNTER: Primary | ICD-10-CM

## 2024-03-20 PROCEDURE — 99204 OFFICE O/P NEW MOD 45 MIN: CPT | Mod: 25,S$GLB,, | Performed by: PEDIATRICS

## 2024-03-20 PROCEDURE — 3044F HG A1C LEVEL LT 7.0%: CPT | Mod: CPTII,S$GLB,, | Performed by: PEDIATRICS

## 2024-03-20 PROCEDURE — 1159F MED LIST DOCD IN RCRD: CPT | Mod: CPTII,S$GLB,, | Performed by: PEDIATRICS

## 2024-03-20 PROCEDURE — 90691 TYPHOID VACCINE IM: CPT | Mod: S$GLB,,, | Performed by: PEDIATRICS

## 2024-03-20 PROCEDURE — 3074F SYST BP LT 130 MM HG: CPT | Mod: CPTII,S$GLB,, | Performed by: PEDIATRICS

## 2024-03-20 PROCEDURE — 90717 YELLOW FEVER VACCINE SUBQ: CPT | Mod: S$GLB,,, | Performed by: PEDIATRICS

## 2024-03-20 PROCEDURE — 90471 IMMUNIZATION ADMIN: CPT | Mod: S$GLB,,, | Performed by: PEDIATRICS

## 2024-03-20 PROCEDURE — 3079F DIAST BP 80-89 MM HG: CPT | Mod: CPTII,S$GLB,, | Performed by: PEDIATRICS

## 2024-03-20 PROCEDURE — 90472 IMMUNIZATION ADMIN EACH ADD: CPT | Mod: S$GLB,,, | Performed by: PEDIATRICS

## 2024-03-20 PROCEDURE — 3008F BODY MASS INDEX DOCD: CPT | Mod: CPTII,S$GLB,, | Performed by: PEDIATRICS

## 2024-03-20 PROCEDURE — 99999 PR PBB SHADOW E&M-EST. PATIENT-LVL V: CPT | Mod: PBBFAC,,, | Performed by: PEDIATRICS

## 2024-03-20 RX ORDER — ONDANSETRON 4 MG/1
4 TABLET, FILM COATED ORAL EVERY 8 HOURS PRN
Qty: 4 TABLET | Refills: 0 | Status: SHIPPED | OUTPATIENT
Start: 2024-03-20 | End: 2024-03-24

## 2024-03-20 RX ORDER — ATOVAQUONE AND PROGUANIL HYDROCHLORIDE 250; 100 MG/1; MG/1
TABLET, FILM COATED ORAL
Qty: 17 TABLET | Refills: 0 | Status: SHIPPED | OUTPATIENT
Start: 2024-03-20 | End: 2024-04-15 | Stop reason: ALTCHOICE

## 2024-03-20 RX ORDER — MUPIROCIN 20 MG/G
OINTMENT TOPICAL 3 TIMES DAILY
Qty: 22 G | Refills: 0 | Status: SHIPPED | OUTPATIENT
Start: 2024-03-20

## 2024-03-20 NOTE — PROGRESS NOTES
"SUBJECTIVE:  Leticia Hyde is a 39 y.o. female here accompanied by friend, who is a historian.    HPI  Patient presents to the clinic with concerns about  getting yellow fever vaccine. Pt is traveling to [facility] on April 16 through April 25, 2024. Pt also needs nausea patch for long flight.    Had all other vaccines include Hep A and B per patient    Diabetic- on meds.  Will bring back up medicine.    Leticia's allergies, medications, history, and problem list were updated as appropriate.    Review of Systems  A comprehensive review of symptoms was completed and negative except as noted in the HPI.    OBJECTIVE:  Vital signs  Vitals:    03/20/24 1042   BP: 124/80   BP Location: Right arm   Patient Position: Sitting   BP Method: Large (Automatic)   Pulse: 91   Temp: 98.1 °F (36.7 °C)   TempSrc: Oral   Weight: 126.5 kg (278 lb 12.8 oz)   Height: 5' 4.5" (1.638 m)        Physical Exam      ASSESSMENT/PLAN:  Leticia was seen today for travel consult.    Diagnoses and all orders for this visit:    Travel advice encounter  -     ondansetron (ZOFRAN) 4 MG tablet; Take 1 tablet (4 mg total) by mouth every 8 (eight) hours as needed for Nausea.  -     atovaquone-proguaniL (MALARONE) 250-100 mg Tab; Take 1 tablet by mouth 1 day before travel, continue until 1 week after return  -     mupirocin (BACTROBAN) 2 % ointment; Apply topically 3 (three) times daily.    Other orders  -     Yellow Fever Vaccine (SQ)  -     Typhoid Vaccine (ViCPs) (IM)         Recent Results (from the past 672 hour(s))   HEMOGLOBIN    Collection Time: 02/26/24  3:28 PM   Result Value Ref Range    Hemoglobin A1C 6.6 (H) 4.7 - 5.6 %    Estimated Average Glucose 143 (H) <115 mg/dL       Age appropriate physical activity and nutritional counseling were completed during today's visit.     Follow Up:  No follow-ups on file.  "

## 2024-03-20 NOTE — PATIENT INSTRUCTIONS
....Sybil Draper [place], [place]  [facility]  [phone]      General Travel Tips   Learn about your destination- rural vs. urban, accommodations, food and water preparation, geography, i. e. elevation, jungle, etc., season of year, medical services available.     Schedule an office visit for a travel medical consultation.  Try to schedule this at least 6 weeks ahead of travel dates (not always possible).     3.  Obtain your current immunization record prior to the office visit.  If you have received your yellow fever vaccine, find your International Certificate of Vaccination     Dental, eye exam recommended, depending on length of travel.   - extra pair of glasses or contacts recommended.     Obtain appropriate amount of your prescription medicines for your travel duration.  Wear a medic alert bracelet for chronic conditions, i. e. diabetes, seizures and specific allergies.      Make sure you are covered by your health insurance for medical issues during your travel.  Many health insurance plans will not cover international travel.  One such provider is [employer] trip insurance.  < [email or web address]   This is not an endorsement of this insurance provider.     Obtain a list of medical providers at your travel destinations (English speaking).     First aid travel kit:     Analgesics, i. e. Acetaminophen (Tylenol) or Ibuprofen (Motrin, Advil)   Antibacterial wipes and Hand , i. e. Purell   Antibiotic ointment, i. e. Polysporin, Triple Antibiotic   Anti- diarrhea caplets, i. e. Imodium, Pepto Bismol   Antihistamine, i. e. Benadryl   Antihistamine (less sedating), i. e. Zyrtec or Claritin   Bandaids and bandages, i. e. Ace wrap    Decongestant, i. e. Sudafed   Eye drops, i. e. Clear eyes, Visine   Gauze pads   Gentle laxative. i. e. Senekot or MOM    Hydrocortisone cream/ointment   Insect repellent   Lip protectants, i. e. Chapstick,  Blistex   Medical tape   Motion sickness tablets, i. e. dramamine   Tums       Prescription medicines to consider:   Bactroban ointment- topical antibacterial for superficial infections   Zofran- dissolving tablet for nausea and vomiting   * if prescription medicines are desired, discuss at travel consultation     9.  Obtain or find your passport, visa or other necessary identification papers.  Make sure they are up to date.  Bring a photocopy of your passport to carry with you, also.     10. Helpful links are below:  Travel Medicine of [place]: <[email or web address]/>    [place] Government Tips:  <http://travel.state.gov/travel/tips/tips_1232.html>    MD Travel Health:  <http://www.[employer]/>    Center for Disease Control:  <http://wwwnc.cdc.gov/travel/>    WebMD: <[email or web address]/article/[email or web address]-vjnihwxu>             Anitra/[place], Shandra    VACCINES:  Routine Vaccines- routinely given through childhood with boosters in adulthood, i.e. Tetanus (Tdap), Measles, Mumps, Rubella (MMR), Polio, Hepatitis B, Meningococcal (MCV4), Varicella etc..  Many times before international travel a booster is needed.  Make sure you are up to date  Recommended Vaccines:  Hepatitis A (two shot series minimum six months apart), Typhoid (one injection)  Required Vaccines:  Yellow Fever is recommended, not Required, if travel to [facility]    MALARIA:  Malaria Prophylaxis- Recommended because you may be traveling into some malaria infected areas ([facility]  Chloroquine is not effective in this region.  Options for prophylaxis are Atovaquone-proguanil (Malarone) or Mefloquine or Doxycycline.    - Malarone is recommended by [facility] pharmacists.  Start 1 day before, stop 1 wk. after return.  Side Effects- nausea, vomiting, headache, weakness, dizziness    Mefloquine is taken once per week starting a two days before travel and  continued for 4 weeks after returning home.  Side effects include dizziness, headache, insomnia, nightmares, depression and anxiety.  Contraindicated in those that have depression, cardiac conduction disorders.  Doxycycline is taken daily starting two days before travel until four weeks after returning home.  Minimal side effects- mild GI upet.  Malarone is taken daily, starting 1 day before and stopping 1 week after return.    Protective Measures:   Use insecticide-impregnated mosquito nets while sleeping.   Remain in well-screened areas.   Wear protective clothing.   Use mosquito repellents containing DEET.    <http://www.cdc.gov/malaria/>  To prevent typhoid and GI illness food preparation is very important.  Protective Measures:   - Food preparation- Boil it, cook it, peel it or forget it  Bottled water, boil for 1 minute; bottled carbonated water noncarbonated.  Ice, popsicles, flavored ice only from boiled or bottled water  Thoroughly cooked food, still hot and steaming.  Avoid raw vegetables that are not peeled or lettuce.   - Peel vegetables yourself (after washing hands), do not eat peelings.  E. Avoid foods, beverages bought from .      If you need any help, let me know

## 2024-04-08 PROBLEM — I82.511 CHRONIC DEEP VEIN THROMBOSIS (DVT) OF FEMORAL VEIN OF RIGHT LOWER EXTREMITY: Status: ACTIVE | Noted: 2024-04-08

## 2024-04-08 PROBLEM — I80.209 THROMBOPHLEBITIS OF DEEP VEINS OF LOWER EXTREMITY: Status: RESOLVED | Noted: 2020-07-20 | Resolved: 2024-04-08

## 2024-05-09 ENCOUNTER — OFFICE VISIT (OUTPATIENT)
Dept: OBSTETRICS AND GYNECOLOGY | Facility: CLINIC | Age: 39
End: 2024-05-09
Payer: COMMERCIAL

## 2024-05-09 VITALS — HEIGHT: 64 IN | BODY MASS INDEX: 46.79 KG/M2 | WEIGHT: 274.06 LBS

## 2024-05-09 DIAGNOSIS — Z79.4 TYPE 2 DIABETES MELLITUS WITH HYPERGLYCEMIA, WITH LONG-TERM CURRENT USE OF INSULIN: ICD-10-CM

## 2024-05-09 DIAGNOSIS — E11.65 TYPE 2 DIABETES MELLITUS WITH HYPERGLYCEMIA, WITH LONG-TERM CURRENT USE OF INSULIN: ICD-10-CM

## 2024-05-09 DIAGNOSIS — N92.0 MENORRHAGIA WITH REGULAR CYCLE: Primary | ICD-10-CM

## 2024-05-09 DIAGNOSIS — Z11.3 SCREEN FOR STD (SEXUALLY TRANSMITTED DISEASE): ICD-10-CM

## 2024-05-09 DIAGNOSIS — Z12.4 PAP SMEAR FOR CERVICAL CANCER SCREENING: ICD-10-CM

## 2024-05-09 DIAGNOSIS — I82.511 CHRONIC DEEP VEIN THROMBOSIS (DVT) OF FEMORAL VEIN OF RIGHT LOWER EXTREMITY: ICD-10-CM

## 2024-05-09 PROCEDURE — 3008F BODY MASS INDEX DOCD: CPT | Mod: CPTII,S$GLB,, | Performed by: OBSTETRICS & GYNECOLOGY

## 2024-05-09 PROCEDURE — 1159F MED LIST DOCD IN RCRD: CPT | Mod: CPTII,S$GLB,, | Performed by: OBSTETRICS & GYNECOLOGY

## 2024-05-09 PROCEDURE — 99204 OFFICE O/P NEW MOD 45 MIN: CPT | Mod: S$GLB,,, | Performed by: OBSTETRICS & GYNECOLOGY

## 2024-05-09 PROCEDURE — 99999 PR PBB SHADOW E&M-EST. PATIENT-LVL V: CPT | Mod: PBBFAC,,, | Performed by: OBSTETRICS & GYNECOLOGY

## 2024-05-09 PROCEDURE — 3044F HG A1C LEVEL LT 7.0%: CPT | Mod: CPTII,S$GLB,, | Performed by: OBSTETRICS & GYNECOLOGY

## 2024-05-09 PROCEDURE — 1160F RVW MEDS BY RX/DR IN RCRD: CPT | Mod: CPTII,S$GLB,, | Performed by: OBSTETRICS & GYNECOLOGY

## 2024-05-09 PROCEDURE — 87591 N.GONORRHOEAE DNA AMP PROB: CPT | Performed by: OBSTETRICS & GYNECOLOGY

## 2024-05-09 PROCEDURE — 88175 CYTOPATH C/V AUTO FLUID REDO: CPT | Performed by: OBSTETRICS & GYNECOLOGY

## 2024-05-09 PROCEDURE — 87624 HPV HI-RISK TYP POOLED RSLT: CPT | Performed by: OBSTETRICS & GYNECOLOGY

## 2024-05-09 NOTE — PROGRESS NOTES
Subjective     Patient ID: Leticia Hyde is a 39 y.o. female.    Chief Complaint:  Establish Care (Consult surgery)      History of Present Illness  HPI  Dysfunctional Uterine Bleeding  Patient was referred by Hematologist (Dr. Myrick) for heavy menses. Pt has a history of chronic DVT and CVA and is currently on long-term Xarelto (has been on this for years).  She had been bleeding regularly. She is now bleeding every 28 days and menses are lasting  7-14  days. She changes her pad or tampon every 1 hours. Clots are 4 cm in size. Dysmenorrhea:moderate, occurring first 1-2 days of flow. Cyclic symptoms include: none. Current contraception: tubal ligation. History of infertility: no. History of abnormal Pap smear: no.  Was seeing Dr. Stover at [facility] but had insurance change.  Pt reports taking Miconor last year without any improvements and had an ablation surgery 3 yrs ago.  Is sexually active and has no desire for future fertility.  Pt desires to discuss hysterectomy as symptoms are very disruptive (has to take days off from work regularly).  Denies transfusion but is getting iron infusions.    GYN & OB History  Patient's last menstrual period was 2024 (exact date).   Date of Last Pap: No result found    OB History    Para Term  AB Living   4 4 4     4   SAB IAB Ectopic Multiple Live Births           4      # Outcome Date GA Lbr Bentley/2nd Weight Sex Type Anes PTL Lv   4 Term  40w0d    CS-LTranv Spinal     3 Term  40w0d    CS-Unspec Spinal     2 Term  42w0d    Vag-Spont EPI     1 Term  42w0d    Vag-Spont EPI         Review of Systems  Review of Systems   Constitutional:  Positive for fatigue. Negative for activity change, appetite change, chills, fever and unexpected weight change.   Respiratory:  Negative for shortness of breath.    Cardiovascular:  Negative for chest pain, palpitations and leg swelling.   Gastrointestinal:  Negative for abdominal pain, bloating, blood in stool, constipation,  diarrhea, nausea and vomiting.   Genitourinary:  Positive for menorrhagia and menstrual problem. Negative for dysmenorrhea, dyspareunia, dysuria, flank pain, frequency, genital sores, hematuria, pelvic pain, urgency, vaginal bleeding, vaginal discharge, vaginal pain, urinary incontinence, postcoital bleeding, vaginal dryness and vaginal odor.   Musculoskeletal:  Negative for back pain.   Integumentary:  Negative for breast mass, nipple discharge, breast skin changes and breast tenderness.   Neurological:  Negative for syncope and headaches.   Breast: Negative for asymmetry, lump, mass, mastodynia, nipple discharge, skin changes and tenderness         Objective   Physical Exam:   Constitutional: She is oriented to person, place, and time. She appears well-developed and well-nourished. No distress.    HENT:   Head: Normocephalic and atraumatic.    Eyes: Pupils are equal, round, and reactive to light. EOM are normal.     Cardiovascular:  Normal rate, regular rhythm and normal heart sounds.             Pulmonary/Chest: Effort normal and breath sounds normal.        Abdominal: Soft. Bowel sounds are normal. She exhibits no distension. There is no abdominal tenderness.     Genitourinary:    Vagina, uterus, right adnexa and left adnexa normal.      Pelvic exam was performed with patient supine.   There is no rash, tenderness, lesion or injury on the right labia. There is no rash, tenderness, lesion or injury on the left labia. Cervix is normal. Right adnexum displays no mass, no tenderness and no fullness. Left adnexum displays no mass, no tenderness and no fullness. No erythema, vaginal discharge, tenderness or bleeding in the vagina.    No foreign body in the vagina.      No signs of injury in the vagina.   Cervix exhibits no motion tenderness, no discharge and no friability.    pap smear completedUterus is not deviated, not enlarged and not tender.           Musculoskeletal: Normal range of motion and moves all  extremeties. No tenderness or edema.       Neurological: She is alert and oriented to person, place, and time.    Skin: Skin is warm and dry.    Psychiatric: She has a normal mood and affect. Her behavior is normal. Thought content normal.            Assessment and Plan     1. Menorrhagia with regular cycle    2. Pap smear for cervical cancer screening    3. Chronic deep vein thrombosis (DVT) of femoral vein of right lower extremity    4. Type 2 diabetes mellitus with hyperglycemia, with long-term current use of insulin    5. Screen for STD (sexually transmitted disease)           Plan:  Menorrhagia with regular cycle  -     US Pelvis Comp with Transvag NON-OB (xpd; Future; Expected date: 05/09/2024  -     Pt was counseled on AUB, including common signs and symptoms.  Symptoms are highly disruptive and have failed trials of conservative therapy/contributed to anemia.  Pt is done with her fertility and has no desire for future childbearing.  Treatment options were reviewed with pt, including medical vs fibroid embolization vs myomectomy vs hysterectomy.  Pt voiced understanding and desires to proceed with total hysterectomy (including removal of uterus, cervix, fallopian tubes).  Pt desires to proceed with ovarian preservation, but is OK with ovarian removal if deemed necessary.  Surgery details, indications, risks, and benefits were reviewed with pt.  Will have PA contact pt to schedule RALH/Bilateral Salpingectomy.      Pap smear for cervical cancer screening  -     Liquid-Based Pap Smear, Screening  -     HPV High Risk Genotypes, PCR  -     Pt was counseled on cervical/vaginal screening guidelines and recommendations.  If today's pap smear result is negative, next pap smear will be due in 3 yrs.  -     Follow up with PCP for routine health maintenance needs.    Chronic deep vein thrombosis (DVT) of femoral vein of right lower extremity  -     Currently on Xarelto.    -     Pt will need medical clearance prior to  surgery.    Type 2 diabetes mellitus with hyperglycemia, with long-term current use of insulin  -     Last A1c stable.    Screen for STD (sexually transmitted disease)  -     C. trachomatis/N. gonorrhoeae by AMP DNA

## 2024-05-10 ENCOUNTER — HOSPITAL ENCOUNTER (OUTPATIENT)
Dept: RADIOLOGY | Facility: HOSPITAL | Age: 39
Discharge: HOME | End: 2024-05-10
Attending: OBSTETRICS & GYNECOLOGY
Payer: COMMERCIAL

## 2024-05-10 DIAGNOSIS — N92.0 MENORRHAGIA WITH REGULAR CYCLE: ICD-10-CM

## 2024-05-10 PROCEDURE — 76856 US EXAM PELVIC COMPLETE: CPT | Mod: 26,,, | Performed by: RADIOLOGY

## 2024-05-10 PROCEDURE — 76856 US EXAM PELVIC COMPLETE: CPT | Mod: TC

## 2024-05-10 PROCEDURE — 76830 TRANSVAGINAL US NON-OB: CPT | Mod: 26,,, | Performed by: RADIOLOGY

## 2024-05-11 LAB
C TRACH DNA SPEC QL NAA+PROBE: NOT DETECTED
N GONORRHOEA DNA SPEC QL NAA+PROBE: NOT DETECTED

## 2024-05-16 LAB
HPV HR 12 DNA SPEC QL NAA+PROBE: NEGATIVE
HPV16 AG SPEC QL: NEGATIVE
HPV18 DNA SPEC QL NAA+PROBE: NEGATIVE

## 2024-05-17 ENCOUNTER — TELEPHONE (OUTPATIENT)
Dept: OBSTETRICS AND GYNECOLOGY | Facility: CLINIC | Age: 39
End: 2024-05-17
Payer: COMMERCIAL

## 2024-05-17 LAB
FINAL PATHOLOGIC DIAGNOSIS: NORMAL
Lab: NORMAL

## 2024-05-17 NOTE — TELEPHONE ENCOUNTER
----- Message from Lisa Suhollie sent at 5/17/2024 11:10 AM CDT -----  Contact: pt  Pt is calling in rgd to see if the nurse would call her back about terri her surgery.  Please call her back at [phone]  ana/[email or web address]

## 2024-05-17 NOTE — TELEPHONE ENCOUNTER
----- Message from Raimundo Fenton MA sent at 5/17/2024  3:49 PM CDT -----  Contact: self   Let her know I will call her Monday, it'll be in July  ----- Message -----  From: Chrissie Mckeon LPN  Sent: 5/17/2024   3:47 PM CDT  To: [facility] Surgery Scheduling    Good Afternoon,     Pt calling in regard to surgery date    Celine  ----- Message -----  From: Shiela Harris  Sent: 5/17/2024   2:48 PM CDT  To: [facility] Staff    Patient needs call back. She is wanting to know if a surgery date for her was  decided on? Please call to advise

## 2024-05-20 ENCOUNTER — PATIENT MESSAGE (OUTPATIENT)
Dept: OBSTETRICS AND GYNECOLOGY | Facility: CLINIC | Age: 39
End: 2024-05-20
Payer: COMMERCIAL

## 2024-05-20 DIAGNOSIS — N92.0 MENORRHAGIA WITH REGULAR CYCLE: Primary | ICD-10-CM

## 2024-07-01 ENCOUNTER — PATIENT MESSAGE (OUTPATIENT)
Dept: PREADMISSION TESTING | Facility: HOSPITAL | Age: 39
End: 2024-07-01
Payer: COMMERCIAL

## 2024-07-01 ENCOUNTER — PATIENT MESSAGE (OUTPATIENT)
Dept: OBSTETRICS AND GYNECOLOGY | Facility: CLINIC | Age: 39
End: 2024-07-01
Payer: COMMERCIAL

## 2024-07-01 ENCOUNTER — TELEPHONE (OUTPATIENT)
Dept: OBSTETRICS AND GYNECOLOGY | Facility: CLINIC | Age: 39
End: 2024-07-01
Payer: COMMERCIAL

## 2024-07-01 NOTE — TELEPHONE ENCOUNTER
Spoke to patient and informed her that we did receive her [place] paperwork at the [facility] location.  Patient voiced understanding and stated that she would like to take the full 8 weeks off for post operative care.  I informed patient that I would forward this information to Iliana.  She voiced understanding.

## 2024-07-01 NOTE — TELEPHONE ENCOUNTER
----- Message from Adia Gordon sent at 7/1/2024  2:08 PM CDT -----  Contact: Leticia Kelly called in regards to she would like to know did  nurse receive halie [facility] papers. Call back is [phone]

## 2024-07-09 ENCOUNTER — OFFICE VISIT (OUTPATIENT)
Dept: OBSTETRICS AND GYNECOLOGY | Facility: CLINIC | Age: 39
End: 2024-07-09
Payer: COMMERCIAL

## 2024-07-09 ENCOUNTER — TELEPHONE (OUTPATIENT)
Dept: PREADMISSION TESTING | Facility: HOSPITAL | Age: 39
End: 2024-07-09
Payer: COMMERCIAL

## 2024-07-09 ENCOUNTER — HOSPITAL ENCOUNTER (OUTPATIENT)
Dept: CARDIOLOGY | Facility: HOSPITAL | Age: 39
Discharge: HOME | End: 2024-07-09
Attending: ANESTHESIOLOGY
Payer: COMMERCIAL

## 2024-07-09 VITALS
DIASTOLIC BLOOD PRESSURE: 70 MMHG | SYSTOLIC BLOOD PRESSURE: 120 MMHG | WEIGHT: 262.13 LBS | HEIGHT: 64 IN | BODY MASS INDEX: 44.75 KG/M2

## 2024-07-09 DIAGNOSIS — E11.65 TYPE 2 DIABETES MELLITUS WITH HYPERGLYCEMIA, WITH LONG-TERM CURRENT USE OF INSULIN: ICD-10-CM

## 2024-07-09 DIAGNOSIS — Z79.4 TYPE 2 DIABETES MELLITUS WITH HYPERGLYCEMIA, WITH LONG-TERM CURRENT USE OF INSULIN: ICD-10-CM

## 2024-07-09 DIAGNOSIS — Z01.818 PREOP TESTING: ICD-10-CM

## 2024-07-09 DIAGNOSIS — I82.511 CHRONIC DEEP VEIN THROMBOSIS (DVT) OF FEMORAL VEIN OF RIGHT LOWER EXTREMITY: ICD-10-CM

## 2024-07-09 DIAGNOSIS — N92.0 MENORRHAGIA WITH REGULAR CYCLE: Primary | ICD-10-CM

## 2024-07-09 LAB
OHS QRS DURATION: 82 MS
OHS QTC CALCULATION: 440 MS

## 2024-07-09 PROCEDURE — 99499 UNLISTED E&M SERVICE: CPT | Mod: S$GLB,,, | Performed by: OBSTETRICS & GYNECOLOGY

## 2024-07-09 PROCEDURE — 99999 PR PBB SHADOW E&M-EST. PATIENT-LVL V: CPT | Mod: PBBFAC,,, | Performed by: OBSTETRICS & GYNECOLOGY

## 2024-07-09 PROCEDURE — 93005 ELECTROCARDIOGRAM TRACING: CPT

## 2024-07-09 PROCEDURE — 93010 ELECTROCARDIOGRAM REPORT: CPT | Mod: ,,, | Performed by: INTERNAL MEDICINE

## 2024-07-09 RX ORDER — CEFAZOLIN SODIUM 2 G/50ML
2 SOLUTION INTRAVENOUS
OUTPATIENT
Start: 2024-07-09

## 2024-07-09 RX ORDER — SODIUM CHLORIDE 9 MG/ML
INJECTION, SOLUTION INTRAVENOUS CONTINUOUS
OUTPATIENT
Start: 2024-07-09

## 2024-07-09 RX ORDER — MUPIROCIN 20 MG/G
OINTMENT TOPICAL
OUTPATIENT
Start: 2024-07-09

## 2024-07-09 RX ORDER — FAMOTIDINE 20 MG/1
20 TABLET, FILM COATED ORAL
OUTPATIENT
Start: 2024-07-09

## 2024-07-09 NOTE — H&P (VIEW-ONLY)
[facility]  Obstetrics & Gynecology  History & Physical    Patient Name: Leticia Hyde  MRN: [id number]  Admission Date: (Not on file)  Primary Care Provider: Dustin Hua MD    Subjective:     Chief Complaint/Reason for Admission: hysterectomy    History of Present Illness:   38 yo  with menorrhagia is here for scheduled hysterectomy.  Pt reports no new complaints and is ready for surgery.    Current Outpatient Medications on File Prior to Visit   Medication Sig    albuterol (VENTOLIN HFA) 90 mcg/actuation inhaler Inhale 2 puffs into the lungs every 6 (six) hours as needed for Wheezing. Rescue    ammonium lactate (LAC-HYDRIN) 12 % lotion Apply twice daily to affected areas on back as tolerated NON FACIAL    augmented betamethasone (DIPROLENE) 0.05 % lotion Apply to scalp nightly for 1 week then apply weekly for maintenance    baclofen (LIORESAL) 10 MG tablet Take 10 mg by mouth 3 (three) times daily.    blood sugar diagnostic Strp 1 each by Misc.(Non-Drug; Combo Route) route 2 (two) times daily.    calcium citrate (CALCITRATE) 200 mg (950 mg) tablet Take 5 tablets (1,000 mg total) by mouth once daily.    celecoxib (CELEBREX) 200 MG capsule TAKE 1 CAPSULE BY MOUTH ONCE DAILY WITH MEALS    cetirizine (ZYRTEC) 10 MG tablet Take 10 mg by mouth once daily.    clindamycin phosphate 1% (CLINDAGEL) 1 % gel APPLY TO CHEEKS AND CHIN TWICE A DAY FOR ACNE UNTIL CLEAR    dapagliflozin propanediol (FARXIGA) 5 mg Tab tablet Take 1 tablet (5 mg total) by mouth once daily.    DEXCOM G6  Misc USE AS DIRECTED TO MONITOR BLOOD SUGAR    DEXCOM G6 SENSOR Merari Inject 1 each into the skin every 10 days.    DEXCOM G6 TRANSMITTER Merari     diphenoxylate-atropine 2.5-0.025 mg (LOMOTIL) 2.5-0.025 mg per tablet Take 1 tablet by mouth 4 (four) times daily as needed.    doxycycline monohydrate 100 mg Tab Take one tablet by mouth twice a day with food when flaring    ferrous sulfate 140 mg (45 mg iron) TbSR Take 1  each by mouth once daily.    fluconazole (DIFLUCAN) 150 MG Tab TAKE 1 TABLET BY MOUTH ONCE NOW THEN REPEAT IN ONE WEEK    fluticasone propionate (FLONASE) 50 mcg/actuation nasal spray 1 spray (50 mcg total) by Each Nostril route once daily.    FREESTYLE LANCETS 28 gauge lancets Inject 1 lancet into the skin 2 (two) times a day.    furosemide (LASIX) 20 MG tablet Take 1 tablet (20 mg total) by mouth daily as needed (swelling).    gabapentin (NEURONTIN) 600 MG tablet     gabapentin (NEURONTIN) 800 MG tablet Take 800 mg by mouth nightly.    glucose 4 GM chewable tablet Take 4 tablets (16 g total) by mouth as needed for Low blood sugar (If having symptoms of blurry vision, palpitations, confusion, shakiness.  Please check sugars and if sugar below 70 please take 4 tablets and re-check sugar everry 15 minutes until sugars are above 70 and symptoms resolve.).    hydrOXYzine pamoate (VISTARIL) 25 MG Cap TAKE 1 CAPSULE BY MOUTH TWICE A DAY AS NEEDED FOR ITCHING    insulin aspart U-100 (NOVOLOG FLEXPEN U-100 INSULIN) 100 unit/mL (3 mL) InPn pen Inject 12 Units into the skin 3 (three) times daily with meals.    insulin degludec (TRESIBA FLEXTOUCH U-200) 200 unit/mL (3 mL) insulin pen Inject 80 Units into the skin once daily.    lactulose (CHRONULAC) 10 gram/15 mL solution Take 30 mLs (20 g total) by mouth every evening.    lancets Misc 1 each by Misc.(Non-Drug; Combo Route) route 2 (two) times daily.    methocarbamoL (ROBAXIN) 750 MG Tab Take 750 mg by mouth 3 (three) times daily as needed.    metoprolol tartrate (LOPRESSOR) 25 MG tablet Take 12.5 mg by mouth 2 (two) times a day.    metroNIDAZOLE 0.75 % Lotn Apply twice daily to Rosacea red areas    mupirocin (BACTROBAN) 2 % ointment Apply topically 3 (three) times daily.    nystatin (MYCOSTATIN) cream Apply topically 2 (two) times daily.    ondansetron (ZOFRAN-ODT) 4 MG TbDL Take 1 tablet (4 mg total) by mouth every 6 (six) hours as needed (nausea).     "oxyCODONE-acetaminophen (PERCOCET)  mg per tablet Take 1 tablet by mouth 4 (four) times daily.    pantoprazole (PROTONIX) 40 MG tablet Take 1 tablet (40 mg total) by mouth once daily.    pen needle, diabetic (PEN NEEDLE) 31 gauge x 1/4" Ndle 1 each by Misc.(Non-Drug; Combo Route) route once daily.    pimecrolimus (ELIDEL) 1 % cream Apply twice daily to rashes as needed    potassium chloride (MICRO-K) 10 MEQ CpSR Take 1 pill when taking Lasix    rizatriptan (MAXALT) 10 MG tablet TAKE 1 TABLET BY MOUTH AT ONSET OF MIGRAINE. MAY REPEAT DOSE ONCE AFTER 2 HOURS AS NEEDED. MAX 2 TABS IN 24 HOURS    Saccharomyces boulardii (FLORASTOR) 250 mg capsule Take 1 capsule (250 mg total) by mouth 2 (two) times daily.    tirzepatide (MOUNJARO) 12.5 mg/0.5 mL PnIj Inject 12.5 mg into the skin every 7 days.    topiramate (TOPAMAX) 25 MG tablet Take 2 tablets by mouth 2 (two) times daily.    traZODone (DESYREL) 100 MG tablet Take 1 tablet (100 mg total) by mouth every evening.    VITAMIN D2 1,250 mcg (50,000 unit) capsule Take 1 capsule (50,000 Units total) by mouth every 7 days.    XARELTO 10 mg Tab Take 1 tablet (10 mg total) by mouth once daily.    rosuvastatin (CRESTOR) 10 MG tablet Take 1 tablet (10 mg total) by mouth once daily.     No current facility-administered medications on file prior to visit.       Review of patient's allergies indicates:   Allergen Reactions    Pcn [penicillins]        Past Medical History:   Diagnosis Date    Anemia     Back pain     CVA (cerebral vascular accident)     Diabetes mellitus, type 2     DVT (deep venous thrombosis)     GERD (gastroesophageal reflux disease)     Migraines     GEOFFREY (obstructive sleep apnea)      OB History    Para Term  AB Living   4 4 4     4   SAB IAB Ectopic Multiple Live Births           4      # Outcome Date GA Lbr Bentley/2nd Weight Sex Type Anes PTL Lv   4 Term  40w0d    CS-LTranv Spinal     3 Term  40w0d    CS-Unspec Spinal     2 Term  42w0d    " Vag-Spont EPI     1 Term  42w0d    Vag-Spont EPI       Past Surgical History:   Procedure Laterality Date     SECTION      CHOLECYSTECTOMY      ENDOMETRIAL ABLATION      OVARIAN CYST REMOVAL      TUBAL LIGATION       Family History       Problem Relation (Age of Onset)    Diabetes type II Mother    Hypertension Mother          Tobacco Use    Smoking status: Never    Smokeless tobacco: Never   Substance and Sexual Activity    Alcohol use: Yes     Comment: social    Drug use: Yes     Types: Marijuana     Comment: medical    Sexual activity: Yes     Partners: Male     Review of Systems   Constitutional:  Positive for fatigue. Negative for activity change, appetite change, chills, fever and unexpected weight change.   Respiratory:  Negative for shortness of breath.    Cardiovascular:  Negative for chest pain, palpitations and leg swelling.   Gastrointestinal:  Negative for abdominal pain, bloating, blood in stool, constipation, diarrhea, nausea and vomiting.   Genitourinary:  Positive for menorrhagia and menstrual problem. Negative for dysmenorrhea, dyspareunia, dysuria, flank pain, frequency, genital sores, hematuria, pelvic pain, urgency, vaginal bleeding, vaginal discharge, vaginal pain, urinary incontinence, postcoital bleeding, vaginal dryness and vaginal odor.   Musculoskeletal:  Negative for back pain.   Neurological:  Negative for syncope and headaches.     Objective:     Vital Signs (Most Recent):  BP: 120/70 (24 1131) Vital Signs (24h Range):  [unfilled]     Weight: 118.9 kg (262 lb 2 oz)  Body mass index is 44.99 kg/m².  Patient's last menstrual period was 06/15/2024 (exact date).    Physical Exam:   Constitutional: She is oriented to person, place, and time. She appears well-developed and well-nourished. No distress.    HENT:   Head: Normocephalic and atraumatic.    Eyes: Pupils are equal, round, and reactive to light. EOM are normal.     Cardiovascular:  Normal rate, regular rhythm and  normal heart sounds.             Pulmonary/Chest: Effort normal and breath sounds normal.        Abdominal: Soft. Bowel sounds are normal. She exhibits no distension. There is no abdominal tenderness.             Musculoskeletal: Normal range of motion and moves all extremeties. No tenderness or edema.       Neurological: She is alert and oriented to person, place, and time.    Skin: Skin is warm and dry.    Psychiatric: She has a normal mood and affect. Her behavior is normal. Thought content normal.       Laboratory:  I have personallly reviewed all pertinent lab results from the last 24 hours.    Diagnostic Results:  Labs: Reviewed  US: Reviewed  Pap reviewed    Assessment/Plan:     There are no hospital problems to display for this patient.      Menorrhagia with regular cycle  -     Procedure risks, benefits, and alternatives were discussed.  Pt voiced understanding and expressed consent for RALH/Bilateral Salpingectomy.  Pt desires ovarian preservation but is OK with ovarian removal if deemed necessary.  Hospital forms were reviewed with pt and signed.  Pre-operative instructions were given.    Type 2 diabetes mellitus with hyperglycemia, with long-term current use of insulin  -    A1c stable.    Chronic deep vein thrombosis (DVT) of femoral vein of right lower extremity  -    Discontinue Xarelto prior to surgery and will restart after surgery.          Ross Medel MD  Obstetrics & Gynecology  [facility]

## 2024-07-09 NOTE — PRE ADMISSION SCREENING
Cardiac clearance request faxed to Dr Binu Cowan office with [facility] in [facility]. Patient informed clearance requested.

## 2024-07-09 NOTE — PROGRESS NOTES
Pt is here for pre-operative visit.  Pt reports no complaints.  Ready for surgery.    ROS Negative     Physical Exam:  See H+P    A/P:  Menorrhagia with regular cycle  -     Procedure risks, benefits, and alternatives were discussed.  Pt voiced understanding and expressed consent for RALH/Bilateral Salpingectomy.  Pt desires ovarian preservation but is OK with ovarian removal if deemed necessary.  Hospital forms were reviewed with pt and signed.  Pre-operative instructions were given.    Type 2 diabetes mellitus with hyperglycemia, with long-term current use of insulin  -    A1c stable.    Chronic deep vein thrombosis (DVT) of femoral vein of right lower extremity  -    Discontinue Xarelto prior to surgery and will restart after surgery.

## 2024-07-09 NOTE — TELEPHONE ENCOUNTER
Pre op instructions reviewed with pt over telephone, verbalized understanding.    To confirm, Surgery is scheduled on 7/15/24. We will call you after 2pm the day before Surgery with your arrival time.    *Please report to the [facility] (1st Floor) located off of [facility] (2nd Entrance/Building on the left, in front of the flag pole).  Address: [address] [place] [address] [place] [id number]      INSTRUCTIONS IMPORTANT!!!  DO NOT Eat, Drink, or Smoke after 12 midnight unless instructed otherwise by your Surgeon. OK to brush teeth, no gum, candy or mints!    >>>MEDICATION INSTRUCTIONS<<<: Morning of Surgery, take small sip of water with ONLY these medications:  Gabapentin if needed  Metoprolol  Topamax If needed      *Blood Thinners: Stop taking Xarelto prior to surgery per Physician Instructions! Call your Surgeon office to inquire about any questions regarding your blood thinner medication.    *Diabetic/ Prediabetic Patients: !!!If you take diabetic or weight loss medication, Do NOT take morning of surgery unless instructed by Doctor!!!  Metformin to be stopped 24 hrs prior to surgery.   Long Acting Insulin Instructions: HOLD the night before surgery unless instructed differently by Provider!  Ozempic/ Mounjaro/ Wegovy/ Trulicity/ Semaglutide injections or weight loss medication to be stopped 7 days prior to surgery.    !!!STOP ALL Aspirins, NSAIDS, WEIGHT LOSS INJECTIONS/PILLS, Herbal supplements, & Vitamins 7 DAYS BEFORE SURGERY!!!    ____  Avoid Alcoholic beverages 3 days prior to surgery, as it can thin the blood.  ____  NO Acrylic/fake nails or nail polish worn day of surgery (specifically hand/arm & foot surgeries).  ____  NO powder, lotions, deodorants, oils or cream on body.  ____  Remove all jewelry & piercings & foreign objects before arrival & leave at home.  ____  Remove Dentures, Hearing Aids & Contact Lens prior to surgery.  ____  Bring photo ID and insurance information to hospital  (Leave Valuables at Home).  ____  If going home the same day, arrange for a ride home. You will not be able to drive for 24 hrs if Anesthesia was used.   ____  Females (ages 11-60): may need to give a urine sample the morning of surgery; please see Pre op Nurse prior to using the restroom.  ____  Males: Stop ED medications (Viagra, Cialis) 24 hrs prior to surgery.  ____  Wear clean, loose fitting clothing to allow for dressings/ bandages.      Bathing Instructions:    -Shower with anti-bacterial Soap (Hibiclens or Dial) the night before surgery and the morning of.   -Do not use Hibiclens on your face or genitals.   -Apply clean clothes after shower.  -Do not shave your face or body 2 days prior to surgery unless instructed otherwise by your Surgeon.  -Do not shave pubic hair 7 days prior to surgery (gyn pt's).    [facility] Visitor/Ride Policy:  Only 2 adults allowed in pre op/recovery area during your procedure. You MUST HAVE A RIDE HOME from a responsible adult that you know and trust. Medical Transport, Uber or Lyft can ONLY be used if patient has a responsible adult to accompany them during ride home.       *Signs and symptoms of Infection Before or After Surgery:               !!!If you experience any fever, chills, nausea/ vomiting, foul odor/ excessive drainage from surgical site, flu-like symptoms, new wounds or cuts, PLEASE CALL THE SURGEON OFFICE at [phone] or SEND MESSAGE THROUGH [employer] PORTAL!!!     *If you are running late the morning of surgery, please call the Hospital Surgery Dept @ [phone].     *Billing questions:  [phone]     Thank you,  -[facility] Pre Admit Dept.  [phone] or [phone]  M-F 7:30 am-4:00 pm (Closed Major Holidays)

## 2024-07-09 NOTE — H&P
[facility] OB GYN [facility]  Obstetrics & Gynecology  History & Physical    Patient Name: Leticia Hyde  MRN: [id number]  Admission Date: (Not on file)  Primary Care Provider: Dustin Hua MD    Subjective:     Chief Complaint/Reason for Admission: hysterectomy    History of Present Illness:   40 yo  with menorrhagia is here for scheduled hysterectomy.  Pt reports no new complaints and is ready for surgery.    Current Outpatient Medications on File Prior to Visit   Medication Sig    albuterol (VENTOLIN HFA) 90 mcg/actuation inhaler Inhale 2 puffs into the lungs every 6 (six) hours as needed for Wheezing. Rescue    ammonium lactate (LAC-HYDRIN) 12 % lotion Apply twice daily to affected areas on back as tolerated NON FACIAL    augmented betamethasone (DIPROLENE) 0.05 % lotion Apply to scalp nightly for 1 week then apply weekly for maintenance    baclofen (LIORESAL) 10 MG tablet Take 10 mg by mouth 3 (three) times daily.    blood sugar diagnostic Strp 1 each by Misc.(Non-Drug; Combo Route) route 2 (two) times daily.    calcium citrate (CALCITRATE) 200 mg (950 mg) tablet Take 5 tablets (1,000 mg total) by mouth once daily.    celecoxib (CELEBREX) 200 MG capsule TAKE 1 CAPSULE BY MOUTH ONCE DAILY WITH MEALS    cetirizine (ZYRTEC) 10 MG tablet Take 10 mg by mouth once daily.    clindamycin phosphate 1% (CLINDAGEL) 1 % gel APPLY TO CHEEKS AND CHIN TWICE A DAY FOR ACNE UNTIL CLEAR    dapagliflozin propanediol (FARXIGA) 5 mg Tab tablet Take 1 tablet (5 mg total) by mouth once daily.    DEXCOM G6  Misc USE AS DIRECTED TO MONITOR BLOOD SUGAR    DEXCOM G6 SENSOR Merari Inject 1 each into the skin every 10 days.    DEXCOM G6 TRANSMITTER Merari     diphenoxylate-atropine 2.5-0.025 mg (LOMOTIL) 2.5-0.025 mg per tablet Take 1 tablet by mouth 4 (four) times daily as needed.    doxycycline monohydrate 100 mg Tab Take one tablet by mouth twice a day with food when flaring    ferrous sulfate 140 mg (45 mg iron) TbSR Take 1  each by mouth once daily.    fluconazole (DIFLUCAN) 150 MG Tab TAKE 1 TABLET BY MOUTH ONCE NOW THEN REPEAT IN ONE WEEK    fluticasone propionate (FLONASE) 50 mcg/actuation nasal spray 1 spray (50 mcg total) by Each Nostril route once daily.    FREESTYLE LANCETS 28 gauge lancets Inject 1 lancet into the skin 2 (two) times a day.    furosemide (LASIX) 20 MG tablet Take 1 tablet (20 mg total) by mouth daily as needed (swelling).    gabapentin (NEURONTIN) 600 MG tablet     gabapentin (NEURONTIN) 800 MG tablet Take 800 mg by mouth nightly.    glucose 4 GM chewable tablet Take 4 tablets (16 g total) by mouth as needed for Low blood sugar (If having symptoms of blurry vision, palpitations, confusion, shakiness.  Please check sugars and if sugar below 70 please take 4 tablets and re-check sugar everry 15 minutes until sugars are above 70 and symptoms resolve.).    hydrOXYzine pamoate (VISTARIL) 25 MG Cap TAKE 1 CAPSULE BY MOUTH TWICE A DAY AS NEEDED FOR ITCHING    insulin aspart U-100 (NOVOLOG FLEXPEN U-100 INSULIN) 100 unit/mL (3 mL) InPn pen Inject 12 Units into the skin 3 (three) times daily with meals.    insulin degludec (TRESIBA FLEXTOUCH U-200) 200 unit/mL (3 mL) insulin pen Inject 80 Units into the skin once daily.    lactulose (CHRONULAC) 10 gram/15 mL solution Take 30 mLs (20 g total) by mouth every evening.    lancets Misc 1 each by Misc.(Non-Drug; Combo Route) route 2 (two) times daily.    methocarbamoL (ROBAXIN) 750 MG Tab Take 750 mg by mouth 3 (three) times daily as needed.    metoprolol tartrate (LOPRESSOR) 25 MG tablet Take 12.5 mg by mouth 2 (two) times a day.    metroNIDAZOLE 0.75 % Lotn Apply twice daily to Rosacea red areas    mupirocin (BACTROBAN) 2 % ointment Apply topically 3 (three) times daily.    nystatin (MYCOSTATIN) cream Apply topically 2 (two) times daily.    ondansetron (ZOFRAN-ODT) 4 MG TbDL Take 1 tablet (4 mg total) by mouth every 6 (six) hours as needed (nausea).     "oxyCODONE-acetaminophen (PERCOCET)  mg per tablet Take 1 tablet by mouth 4 (four) times daily.    pantoprazole (PROTONIX) 40 MG tablet Take 1 tablet (40 mg total) by mouth once daily.    pen needle, diabetic (PEN NEEDLE) 31 gauge x 1/4" Ndle 1 each by Misc.(Non-Drug; Combo Route) route once daily.    pimecrolimus (ELIDEL) 1 % cream Apply twice daily to rashes as needed    potassium chloride (MICRO-K) 10 MEQ CpSR Take 1 pill when taking Lasix    rizatriptan (MAXALT) 10 MG tablet TAKE 1 TABLET BY MOUTH AT ONSET OF MIGRAINE. MAY REPEAT DOSE ONCE AFTER 2 HOURS AS NEEDED. MAX 2 TABS IN 24 HOURS    Saccharomyces boulardii (FLORASTOR) 250 mg capsule Take 1 capsule (250 mg total) by mouth 2 (two) times daily.    tirzepatide (MOUNJARO) 12.5 mg/0.5 mL PnIj Inject 12.5 mg into the skin every 7 days.    topiramate (TOPAMAX) 25 MG tablet Take 2 tablets by mouth 2 (two) times daily.    traZODone (DESYREL) 100 MG tablet Take 1 tablet (100 mg total) by mouth every evening.    VITAMIN D2 1,250 mcg (50,000 unit) capsule Take 1 capsule (50,000 Units total) by mouth every 7 days.    XARELTO 10 mg Tab Take 1 tablet (10 mg total) by mouth once daily.    rosuvastatin (CRESTOR) 10 MG tablet Take 1 tablet (10 mg total) by mouth once daily.     No current facility-administered medications on file prior to visit.       Review of patient's allergies indicates:   Allergen Reactions    Pcn [penicillins]        Past Medical History:   Diagnosis Date    Anemia     Back pain     CVA (cerebral vascular accident)     Diabetes mellitus, type 2     DVT (deep venous thrombosis)     GERD (gastroesophageal reflux disease)     Migraines     GEOFFREY (obstructive sleep apnea)      OB History    Para Term  AB Living   4 4 4     4   SAB IAB Ectopic Multiple Live Births           4      # Outcome Date GA Lbr Bentley/2nd Weight Sex Type Anes PTL Lv   4 Term  40w0d    CS-LTranv Spinal     3 Term  40w0d    CS-Unspec Spinal     2 Term  42w0d    " Vag-Spont EPI     1 Term  42w0d    Vag-Spont EPI       Past Surgical History:   Procedure Laterality Date     SECTION      CHOLECYSTECTOMY      ENDOMETRIAL ABLATION      OVARIAN CYST REMOVAL      TUBAL LIGATION       Family History       Problem Relation (Age of Onset)    Diabetes type II Mother    Hypertension Mother          Tobacco Use    Smoking status: Never    Smokeless tobacco: Never   Substance and Sexual Activity    Alcohol use: Yes     Comment: social    Drug use: Yes     Types: Marijuana     Comment: medical    Sexual activity: Yes     Partners: Male     Review of Systems   Constitutional:  Positive for fatigue. Negative for activity change, appetite change, chills, fever and unexpected weight change.   Respiratory:  Negative for shortness of breath.    Cardiovascular:  Negative for chest pain, palpitations and leg swelling.   Gastrointestinal:  Negative for abdominal pain, bloating, blood in stool, constipation, diarrhea, nausea and vomiting.   Genitourinary:  Positive for menorrhagia and menstrual problem. Negative for dysmenorrhea, dyspareunia, dysuria, flank pain, frequency, genital sores, hematuria, pelvic pain, urgency, vaginal bleeding, vaginal discharge, vaginal pain, urinary incontinence, postcoital bleeding, vaginal dryness and vaginal odor.   Musculoskeletal:  Negative for back pain.   Neurological:  Negative for syncope and headaches.     Objective:     Vital Signs (Most Recent):  BP: 120/70 (24 1131) Vital Signs (24h Range):  [unfilled]     Weight: 118.9 kg (262 lb 2 oz)  Body mass index is 44.99 kg/m².  Patient's last menstrual period was 06/15/2024 (exact date).    Physical Exam:   Constitutional: She is oriented to person, place, and time. She appears well-developed and well-nourished. No distress.    HENT:   Head: Normocephalic and atraumatic.    Eyes: Pupils are equal, round, and reactive to light. EOM are normal.     Cardiovascular:  Normal rate, regular rhythm and  normal heart sounds.             Pulmonary/Chest: Effort normal and breath sounds normal.        Abdominal: Soft. Bowel sounds are normal. She exhibits no distension. There is no abdominal tenderness.             Musculoskeletal: Normal range of motion and moves all extremeties. No tenderness or edema.       Neurological: She is alert and oriented to person, place, and time.    Skin: Skin is warm and dry.    Psychiatric: She has a normal mood and affect. Her behavior is normal. Thought content normal.       Laboratory:  I have personallly reviewed all pertinent lab results from the last 24 hours.    Diagnostic Results:  Labs: Reviewed  US: Reviewed  Pap reviewed    Assessment/Plan:     There are no hospital problems to display for this patient.      Menorrhagia with regular cycle  -     Procedure risks, benefits, and alternatives were discussed.  Pt voiced understanding and expressed consent for RALH/Bilateral Salpingectomy.  Pt desires ovarian preservation but is OK with ovarian removal if deemed necessary.  Hospital forms were reviewed with pt and signed.  Pre-operative instructions were given.    Type 2 diabetes mellitus with hyperglycemia, with long-term current use of insulin  -    A1c stable.    Chronic deep vein thrombosis (DVT) of femoral vein of right lower extremity  -    Discontinue Xarelto prior to surgery and will restart after surgery.          Ross Medel MD  Obstetrics & Gynecology  [facility]

## 2024-07-11 ENCOUNTER — TELEPHONE (OUTPATIENT)
Dept: PREADMISSION TESTING | Facility: HOSPITAL | Age: 39
End: 2024-07-11
Payer: COMMERCIAL

## 2024-07-11 NOTE — PRE ADMISSION SCREENING
Avril Fuentes, RN  [facility] Staff  Cc: Ross Medel MD  Good morning,  We have requested a Cardiac clearance before surgery d/t her Loop Recorder and medical history. I faxed request on 7/9 and called [facility] today. Staff at [facility] stated that patient will need to be seen prior to clearance being obtained. I will also try to reach out to pt to encourage her to schedule cardiac preop appt asap. I wanted to make sure Dr Medel is aware of pending clearance. I also did not see any surgery consents in media. Thank you.    Surgery Pre-Admit Nurse  [facility] ([facility])  [phone] or [phone]  Mon-Fri 7:30 am- 4 pm

## 2024-07-12 ENCOUNTER — TELEPHONE (OUTPATIENT)
Dept: PREADMISSION TESTING | Facility: HOSPITAL | Age: 39
End: 2024-07-12
Payer: COMMERCIAL

## 2024-07-12 NOTE — TELEPHONE ENCOUNTER
Called and spoke with Pt's daughter about the following:     Please arrive to [facility] ([facility]) at 8:00 am on 7/15/2024 for your scheduled procedure.  Address: [address] [place] [address] [place] [address] (2nd Building on left, 1st Floor Lobby)  >>>NO eating or drinking after midnight unless instructed otherwise by your Surgeon<<<

## 2024-07-15 ENCOUNTER — HOSPITAL ENCOUNTER (OUTPATIENT)
Facility: HOSPITAL | Age: 39
Discharge: HOME | End: 2024-07-15
Attending: OBSTETRICS & GYNECOLOGY | Admitting: OBSTETRICS & GYNECOLOGY
Payer: COMMERCIAL

## 2024-07-15 ENCOUNTER — ANESTHESIA EVENT (OUTPATIENT)
Dept: SURGERY | Facility: HOSPITAL | Age: 39
End: 2024-07-15
Payer: COMMERCIAL

## 2024-07-15 ENCOUNTER — ANESTHESIA (OUTPATIENT)
Dept: SURGERY | Facility: HOSPITAL | Age: 39
End: 2024-07-15
Payer: COMMERCIAL

## 2024-07-15 VITALS
BODY MASS INDEX: 44.15 KG/M2 | DIASTOLIC BLOOD PRESSURE: 71 MMHG | RESPIRATION RATE: 5 BRPM | TEMPERATURE: 98 F | SYSTOLIC BLOOD PRESSURE: 124 MMHG | WEIGHT: 265 LBS | HEIGHT: 65 IN | HEART RATE: 82 BPM | OXYGEN SATURATION: 96 %

## 2024-07-15 DIAGNOSIS — Z01.818 PREOP TESTING: ICD-10-CM

## 2024-07-15 DIAGNOSIS — N92.0 MENORRHAGIA WITH REGULAR CYCLE: ICD-10-CM

## 2024-07-15 DIAGNOSIS — Z90.710 STATUS POST LAPAROSCOPIC HYSTERECTOMY: Primary | ICD-10-CM

## 2024-07-15 LAB
B-HCG UR QL: NEGATIVE
CTP QC/QA: YES
POCT GLUCOSE: 150 MG/DL (ref 70–110)
POCT GLUCOSE: 95 MG/DL (ref 70–110)

## 2024-07-15 PROCEDURE — 37000008 HC ANESTHESIA 1ST 15 MINUTES: Performed by: OBSTETRICS & GYNECOLOGY

## 2024-07-15 PROCEDURE — 88307 TISSUE EXAM BY PATHOLOGIST: CPT | Performed by: PATHOLOGY

## 2024-07-15 PROCEDURE — 71000039 HC RECOVERY, EACH ADD'L HOUR: Performed by: OBSTETRICS & GYNECOLOGY

## 2024-07-15 PROCEDURE — 71000015 HC POSTOP RECOV 1ST HR: Performed by: OBSTETRICS & GYNECOLOGY

## 2024-07-15 PROCEDURE — 71000033 HC RECOVERY, INTIAL HOUR: Performed by: OBSTETRICS & GYNECOLOGY

## 2024-07-15 PROCEDURE — 25000003 PHARM REV CODE 250: Performed by: OBSTETRICS & GYNECOLOGY

## 2024-07-15 PROCEDURE — 63600175 PHARM REV CODE 636 W HCPCS: Performed by: NURSE ANESTHETIST, CERTIFIED REGISTERED

## 2024-07-15 PROCEDURE — 37000009 HC ANESTHESIA EA ADD 15 MINS: Performed by: OBSTETRICS & GYNECOLOGY

## 2024-07-15 PROCEDURE — 36000713 HC OR TIME LEV V EA ADD 15 MIN: Performed by: OBSTETRICS & GYNECOLOGY

## 2024-07-15 PROCEDURE — 25000003 PHARM REV CODE 250: Performed by: NURSE ANESTHETIST, CERTIFIED REGISTERED

## 2024-07-15 PROCEDURE — 63600175 PHARM REV CODE 636 W HCPCS: Performed by: ANESTHESIOLOGY

## 2024-07-15 PROCEDURE — 63600175 PHARM REV CODE 636 W HCPCS: Performed by: OBSTETRICS & GYNECOLOGY

## 2024-07-15 PROCEDURE — 36000712 HC OR TIME LEV V 1ST 15 MIN: Performed by: OBSTETRICS & GYNECOLOGY

## 2024-07-15 PROCEDURE — 58571 TLH W/T/O 250 G OR LESS: CPT | Mod: 22,,, | Performed by: OBSTETRICS & GYNECOLOGY

## 2024-07-15 PROCEDURE — 81025 URINE PREGNANCY TEST: CPT | Performed by: OBSTETRICS & GYNECOLOGY

## 2024-07-15 PROCEDURE — 27201423 OPTIME MED/SURG SUP & DEVICES STERILE SUPPLY: Performed by: OBSTETRICS & GYNECOLOGY

## 2024-07-15 PROCEDURE — C2628 CATHETER, OCCLUSION: HCPCS | Performed by: OBSTETRICS & GYNECOLOGY

## 2024-07-15 RX ORDER — ONDANSETRON 8 MG/1
8 TABLET, ORALLY DISINTEGRATING ORAL EVERY 8 HOURS PRN
Status: DISCONTINUED | OUTPATIENT
Start: 2024-07-15 | End: 2024-07-15 | Stop reason: HOSPADM

## 2024-07-15 RX ORDER — FENTANYL CITRATE 50 UG/ML
INJECTION, SOLUTION INTRAMUSCULAR; INTRAVENOUS
Status: DISCONTINUED | OUTPATIENT
Start: 2024-07-15 | End: 2024-07-15

## 2024-07-15 RX ORDER — SODIUM CHLORIDE 9 MG/ML
INJECTION, SOLUTION INTRAVENOUS CONTINUOUS
Status: DISCONTINUED | OUTPATIENT
Start: 2024-07-15 | End: 2024-07-15 | Stop reason: HOSPADM

## 2024-07-15 RX ORDER — OXYCODONE AND ACETAMINOPHEN 10; 325 MG/1; MG/1
1 TABLET ORAL EVERY 4 HOURS PRN
Status: CANCELLED | OUTPATIENT
Start: 2024-07-15

## 2024-07-15 RX ORDER — OXYCODONE AND ACETAMINOPHEN 5; 325 MG/1; MG/1
1 TABLET ORAL
Status: DISCONTINUED | OUTPATIENT
Start: 2024-07-15 | End: 2024-07-15 | Stop reason: HOSPADM

## 2024-07-15 RX ORDER — CHLORHEXIDINE GLUCONATE ORAL RINSE 1.2 MG/ML
10 SOLUTION DENTAL 2 TIMES DAILY
Status: CANCELLED | OUTPATIENT
Start: 2024-07-15 | End: 2024-07-20

## 2024-07-15 RX ORDER — FAMOTIDINE 20 MG/1
20 TABLET, FILM COATED ORAL
Status: COMPLETED | OUTPATIENT
Start: 2024-07-15 | End: 2024-07-15

## 2024-07-15 RX ORDER — LIDOCAINE HYDROCHLORIDE 20 MG/ML
INJECTION INTRAVENOUS
Status: DISCONTINUED | OUTPATIENT
Start: 2024-07-15 | End: 2024-07-15

## 2024-07-15 RX ORDER — HYDROMORPHONE HYDROCHLORIDE 2 MG/ML
0.2 INJECTION, SOLUTION INTRAMUSCULAR; INTRAVENOUS; SUBCUTANEOUS EVERY 5 MIN PRN
Status: DISCONTINUED | OUTPATIENT
Start: 2024-07-15 | End: 2024-07-15 | Stop reason: HOSPADM

## 2024-07-15 RX ORDER — DIPHENHYDRAMINE HCL 25 MG
25 CAPSULE ORAL EVERY 4 HOURS PRN
Status: CANCELLED | OUTPATIENT
Start: 2024-07-15

## 2024-07-15 RX ORDER — MIDAZOLAM HYDROCHLORIDE 1 MG/ML
INJECTION INTRAMUSCULAR; INTRAVENOUS
Status: DISCONTINUED | OUTPATIENT
Start: 2024-07-15 | End: 2024-07-15

## 2024-07-15 RX ORDER — MUPIROCIN 20 MG/G
OINTMENT TOPICAL
Status: DISCONTINUED | OUTPATIENT
Start: 2024-07-15 | End: 2024-07-15 | Stop reason: HOSPADM

## 2024-07-15 RX ORDER — HYDROMORPHONE HYDROCHLORIDE 2 MG/ML
1 INJECTION, SOLUTION INTRAMUSCULAR; INTRAVENOUS; SUBCUTANEOUS EVERY 6 HOURS PRN
Status: CANCELLED | OUTPATIENT
Start: 2024-07-15

## 2024-07-15 RX ORDER — OXYCODONE AND ACETAMINOPHEN 5; 325 MG/1; MG/1
1 TABLET ORAL EVERY 4 HOURS PRN
Qty: 20 TABLET | Refills: 0 | Status: SHIPPED | OUTPATIENT
Start: 2024-07-15

## 2024-07-15 RX ORDER — ONDANSETRON HYDROCHLORIDE 2 MG/ML
4 INJECTION, SOLUTION INTRAVENOUS ONCE AS NEEDED
Status: DISCONTINUED | OUTPATIENT
Start: 2024-07-15 | End: 2024-07-15 | Stop reason: HOSPADM

## 2024-07-15 RX ORDER — ONDANSETRON HYDROCHLORIDE 2 MG/ML
INJECTION, SOLUTION INTRAVENOUS
Status: DISCONTINUED | OUTPATIENT
Start: 2024-07-15 | End: 2024-07-15

## 2024-07-15 RX ORDER — MEPERIDINE HYDROCHLORIDE 25 MG/ML
12.5 INJECTION INTRAMUSCULAR; INTRAVENOUS; SUBCUTANEOUS ONCE AS NEEDED
Status: DISCONTINUED | OUTPATIENT
Start: 2024-07-15 | End: 2024-07-15 | Stop reason: HOSPADM

## 2024-07-15 RX ORDER — OXYCODONE AND ACETAMINOPHEN 5; 325 MG/1; MG/1
1 TABLET ORAL EVERY 4 HOURS PRN
Status: CANCELLED | OUTPATIENT
Start: 2024-07-15

## 2024-07-15 RX ORDER — SODIUM CHLORIDE 9 MG/ML
INJECTION, SOLUTION INTRAVENOUS CONTINUOUS
Status: CANCELLED | OUTPATIENT
Start: 2024-07-15

## 2024-07-15 RX ORDER — ONDANSETRON HYDROCHLORIDE 2 MG/ML
4 INJECTION, SOLUTION INTRAVENOUS DAILY PRN
Status: DISCONTINUED | OUTPATIENT
Start: 2024-07-15 | End: 2024-07-15 | Stop reason: HOSPADM

## 2024-07-15 RX ORDER — ROCURONIUM BROMIDE 10 MG/ML
INJECTION, SOLUTION INTRAVENOUS
Status: DISCONTINUED | OUTPATIENT
Start: 2024-07-15 | End: 2024-07-15

## 2024-07-15 RX ORDER — IBUPROFEN 800 MG/1
800 TABLET ORAL EVERY 8 HOURS PRN
Qty: 30 TABLET | Refills: 0 | Status: SHIPPED | OUTPATIENT
Start: 2024-07-15 | End: 2025-07-15

## 2024-07-15 RX ORDER — SIMETHICONE 80 MG
80 TABLET,CHEWABLE ORAL EVERY 4 HOURS PRN
Status: CANCELLED | OUTPATIENT
Start: 2024-07-15

## 2024-07-15 RX ORDER — PROPOFOL 10 MG/ML
VIAL (ML) INTRAVENOUS
Status: DISCONTINUED | OUTPATIENT
Start: 2024-07-15 | End: 2024-07-15

## 2024-07-15 RX ORDER — DEXAMETHASONE SODIUM PHOSPHATE 4 MG/ML
INJECTION, SOLUTION INTRA-ARTICULAR; INTRALESIONAL; INTRAMUSCULAR; INTRAVENOUS; SOFT TISSUE
Status: DISCONTINUED | OUTPATIENT
Start: 2024-07-15 | End: 2024-07-15

## 2024-07-15 RX ORDER — KETOROLAC TROMETHAMINE 30 MG/ML
30 INJECTION, SOLUTION INTRAMUSCULAR; INTRAVENOUS EVERY 6 HOURS
Status: DISCONTINUED | OUTPATIENT
Start: 2024-07-15 | End: 2024-07-15 | Stop reason: HOSPADM

## 2024-07-15 RX ORDER — FENTANYL CITRATE 50 UG/ML
25 INJECTION, SOLUTION INTRAMUSCULAR; INTRAVENOUS EVERY 5 MIN PRN
Status: DISCONTINUED | OUTPATIENT
Start: 2024-07-15 | End: 2024-07-15 | Stop reason: HOSPADM

## 2024-07-15 RX ADMIN — PROPOFOL 160 MG: 10 INJECTION, EMULSION INTRAVENOUS at 10:07

## 2024-07-15 RX ADMIN — ONDANSETRON 4 MG: 2 INJECTION INTRAMUSCULAR; INTRAVENOUS at 11:07

## 2024-07-15 RX ADMIN — FENTANYL CITRATE 100 MCG: 50 INJECTION, SOLUTION INTRAMUSCULAR; INTRAVENOUS at 10:07

## 2024-07-15 RX ADMIN — HYDROMORPHONE HYDROCHLORIDE 0.2 MG: 2 INJECTION INTRAMUSCULAR; INTRAVENOUS; SUBCUTANEOUS at 01:07

## 2024-07-15 RX ADMIN — HYDROMORPHONE HYDROCHLORIDE 0.2 MG: 2 INJECTION INTRAMUSCULAR; INTRAVENOUS; SUBCUTANEOUS at 12:07

## 2024-07-15 RX ADMIN — DEXAMETHASONE SODIUM PHOSPHATE 8 MG: 4 INJECTION, SOLUTION INTRA-ARTICULAR; INTRALESIONAL; INTRAMUSCULAR; INTRAVENOUS; SOFT TISSUE at 10:07

## 2024-07-15 RX ADMIN — LIDOCAINE HYDROCHLORIDE 100 MG: 20 INJECTION INTRAVENOUS at 10:07

## 2024-07-15 RX ADMIN — FAMOTIDINE 20 MG: 20 TABLET ORAL at 08:07

## 2024-07-15 RX ADMIN — MIDAZOLAM HYDROCHLORIDE 2 MG: 1 INJECTION, SOLUTION INTRAMUSCULAR; INTRAVENOUS at 10:07

## 2024-07-15 RX ADMIN — CEFAZOLIN 3 G: 2 INJECTION, POWDER, FOR SOLUTION INTRAMUSCULAR; INTRAVENOUS at 10:07

## 2024-07-15 RX ADMIN — ROCURONIUM BROMIDE 50 MG: 10 INJECTION, SOLUTION INTRAVENOUS at 10:07

## 2024-07-15 RX ADMIN — SODIUM CHLORIDE, SODIUM LACTATE, POTASSIUM CHLORIDE, AND CALCIUM CHLORIDE: .6; .31; .03; .02 INJECTION, SOLUTION INTRAVENOUS at 10:07

## 2024-07-15 NOTE — OP NOTE
OPERATIVE NOTE    DATE:  07/15/2024    PRE-PROCEDURE COUNSELING:  Patient counseled on the risks, benefits, and alternatives to procedure.  Please see preoperative consents.   SCDs were applied and working prior to anesthesia induction.                                                                                    PREOPERATIVE DIAGNOSES: Menorrhagia                                                                               POSTOPERATIVE DIAGNOSES:  Same                                                                                ANESTHESIA:  General Endotracheal Anesthesia    PROCEDURE:    Robot-Assisted Laparoscopic Hysterectomy  Bilateral Salpingectomy   Lysis of Adhesions for 25 min    SURGEON:  Ross Medel M.D.                                                                                               ASSISTANT:  Aleyda Joseph SCT                                                                                                                                    FINDINGS: Omental adhesions to anterior abdominal wall; 8 week uterus with fibrous adhesions to anterior abdomen; defects in bilateral tubes consistent with prior surgery; normal appearing ovaries bilaterally    SPECIMEN: Uterus, Cervix, Tubes    EBL:  25 mL    COMPLICATIONS:  None    IMPLANTS:  None                                                                               PROCEDURE IN DETAIL:    After discussion of the risks, benefits and alternatives, the patient understood the potential risks and complications and signed consents were reviewed. Patient was given preoperative sequential compression devices and antibiotics and was taken to the Operating Room where the general endotracheal anesthesia was administered and found to be adequate.  She was prepped and draped in routine fashion. The HEMANT manipulator was applied in routine fashion.  Attention was turned to the abdomen where the anterior abdominal wall was grasped with towel  clamps and elevated.  Veress needle was introduced through the umbilicus and abdomen was insufflated with CO2 gas to 15 mmHg.  An 8 mm incision was made LUQ and a 8 mm trocar was placed into the abdomen. Under direct visualization, ancillary ports were placed.  This included a total of four 8 mm lateral ports (two in the LUQ and two in the RUQ).  The patient was noted to be in correct anatomical position, placed in Trendelenburg, and robotic operating system was advanced towards the patient. Robotic arms were attached to trocar ends and the operating instruments were placed into the abdomen under direct visualization from the console.This included Bipolar [place] for cautery and EndoShears for cold-cut transection.  A survey of the pelvis was made. All pedicles were surgically transected in a similar fashion: with bipolar cautery followed by a cold cut transection.      Omental adhesions to anterior abdomen were lysed with sharp and blunt dissection until anatomy was restored (25 minutes).  The path of the ureter was visualized. Next, the utero-ovarian ligament, salpinx, mesosalpinx, round ligament were cauterized and transected followed by successive pedicles of the posterior broad ligament, round ligament and carried through the anterior broad ligament to create a bladder flap. Same was performed on the other side and the uterine arteries were skeletonized on both sides. Anterior followed by posterior colpotomies were made.  Next, the left uterine artery was then cauterized and transected followed by successive pedicles of the cardinal ligament to reach the colpotomy site. The same was performed on the other side. The uterus and cervix were removed through the vagina and sent to pathology.  The vaginal incision was then approximated with 0 Vicryl in a running locked fashion.  Lapra-Tys were applied at the ends of the incision, and an intracorporal knot was tied in the center of the incision.  Irrigation,  desufflation and reinsufflation confirmed hemostasis.  All instruments were removed and the abdomen deflated.  All skin wounds were closed with 4-0 Moncryl and supported with Dermabond.  Lap, needle and instrument counts were correct x 2.       Patient was sent to the recovery room in stable condition.

## 2024-07-15 NOTE — ANESTHESIA PREPROCEDURE EVALUATION
07/15/2024  Leticia Hyde is a 39 y.o., female.      Pre-op Assessment    I have reviewed the Patient Summary Reports.     I have reviewed the Nursing Notes. I have reviewed the NPO Status.      Review of Systems  Anesthesia Hx:  No problems with previous Anesthesia                Hematology/Oncology:  Hematology Normal   Oncology Normal                Hematology Comments: Hx deep venous thrombosis                    Cardiovascular:                   ECG has been reviewed.                          Pulmonary:        Sleep Apnea                Renal/:  Renal/ Normal                 Hepatic/GI:     GERD             Musculoskeletal:  Arthritis               Neurological:   CVA Neuromuscular Disease,  Headaches     Lumbosacral radiculopathy  Lumbosacral spondylosis without myelopathy                                Endocrine:  Diabetes         Morbid Obesity / BMI > 40  Dermatological:  Skin Normal    Psych:  Psychiatric Normal                  Patient Active Problem List   Diagnosis    Morbid obesity    Pain and swelling of lower leg, right    Rheumatoid factor positive    Type 2 diabetes mellitus with hyperglycemia, with long-term current use of insulin    Long term (current) use of anticoagulants    Abnormal imaging of central nervous system    Chronic migraine without aura    Deep peroneal neuropathy of right lower extremity    Lumbosacral radiculopathy    Lumbosacral spondylosis without myelopathy    Tibial neuropathy    CRP elevated    Elevated parathyroid hormone    Gastroesophageal reflux disease without esophagitis    H/O deep venous thrombosis    H/O: CVA (cerebrovascular accident)    High blood copper level    Iron deficiency    Low serum prealbumin    GEOFFREY (obstructive sleep apnea)    Vitamin D deficiency    Chronic deep vein thrombosis (DVT) of femoral vein of right lower extremity          Physical Exam  General: Well nourished, Cooperative, Alert and Oriented    Airway:  Mallampati: I / II  Mouth Opening: Normal  TM Distance: Normal  Tongue: Normal  Neck ROM: Normal ROM    Dental:  Intact, Caps / Implants        Anesthesia Plan  Type of Anesthesia, risks & benefits discussed:    Anesthesia Type: Gen ETT  Intra-op Monitoring Plan: Standard ASA Monitors  Post Op Pain Control Plan: multimodal analgesia  Induction:  IV  Airway Plan: Direct  Informed Consent: Informed consent signed with the Patient and all parties understand the risks and agree with anesthesia plan.  All questions answered.   ASA Score: 3    Ready For Surgery From Anesthesia Perspective.     .      Chemistry        Component Value Date/Time     07/09/2024 1324    K 3.6 07/09/2024 1324     07/09/2024 1324    CO2 20 (L) 07/09/2024 1324    BUN 9 07/09/2024 1324    CREATININE 0.7 07/09/2024 1324    GLU 80 07/09/2024 1324        Component Value Date/Time    CALCIUM 9.6 07/09/2024 1324    ALKPHOS 71 07/14/2020 0656    AST 13 06/04/2024 0741    ALT 13 06/04/2024 0741    BILITOT 0.2 06/04/2024 0741    ESTGFRAFRICA >105 01/11/2023 1456    EGFRNONAA 115 11/18/2021 1052        Lab Results   Component Value Date    WBC 6.62 07/09/2024    HGB 11.7 (L) 07/09/2024    HCT 37.6 07/09/2024    MCV 88 07/09/2024     07/09/2024

## 2024-07-15 NOTE — ANESTHESIA PROCEDURE NOTES
Intubation    Date/Time: 7/15/2024 10:13 AM    Performed by: Clemente Ramirez CRNA  Authorized by: Morales Fatima II, MD    Intubation:     Induction:  Intravenous    Intubated:  Postinduction    Mask Ventilation:  Easy mask    Attempts:  1    Attempted By:  CRNA    Method of Intubation:  Direct    Blade:  Blanca 3    Laryngeal View Grade: Grade I - full view of cords      Difficult Airway Encountered?: No      Complications:  None    Airway Device:  Oral endotracheal tube    Airway Device Size:  8.0    Style/Cuff Inflation:  Cuffed (inflated to minimal occlusive pressure)    Inflation Amount (mL):  8    Tube secured:  21    Secured at:  The lips    Placement Verified By:  Capnometry    Complicating Factors:  None    Findings Post-Intubation:  BS equal bilateral

## 2024-07-15 NOTE — DISCHARGE SUMMARY
Discharge Note  Short Stay      SUMMARY     Admit Date: 7/15/2024    Attending Physician: Ross Medel MD     Discharge Physician: Ross Medel MD    Discharge Date: 7/15/2024 12:00 PM    Final Diagnosis:   1. Status post laparoscopic hysterectomy    2. Preop testing    3. Menorrhagia with regular cycle        Disposition: Home or Self Care    Condition:  Stable    Hospital Course:  Pt was admitted for scheduled hysterectomy.  RALH/Bilateral Salpingectomy/CHRIS was performed without complications.  Post-operative course was unremarkable and pt recovered well.  Pt was discharged on request and upon meeting all discharge criteria.  Pt was counseled on post-operative care and warning sign instructions prior to her discharge.    Patient Instructions:   Current Discharge Medication List        START taking these medications    Details   ibuprofen (ADVIL,MOTRIN) 800 MG tablet Take 1 tablet (800 mg total) by mouth every 8 (eight) hours as needed for Pain.  Qty: 30 tablet, Refills: 0    Associated Diagnoses: Status post laparoscopic hysterectomy      oxyCODONE-acetaminophen (PERCOCET) 5-325 mg per tablet Take 1 tablet by mouth every 4 (four) hours as needed for Pain.  Qty: 20 tablet, Refills: 0    Comments: Quantity prescribed more than 7 day supply? No  Associated Diagnoses: Status post laparoscopic hysterectomy           CONTINUE these medications which have NOT CHANGED    Details   DEXCOM G6 TRANSMITTER Merari       ferrous sulfate 140 mg (45 mg iron) TbSR Take 1 each by mouth once daily.  Qty: 30 tablet, Refills: 2    Associated Diagnoses: Iron deficiency anemia secondary to inadequate dietary iron intake      furosemide (LASIX) 20 MG tablet Take 1 tablet (20 mg total) by mouth daily as needed (swelling).  Qty: 30 tablet, Refills: 2    Associated Diagnoses: Lower leg edema      !! gabapentin (NEURONTIN) 600 MG tablet       !! gabapentin (NEURONTIN) 800 MG tablet Take 800 mg by mouth nightly.      hydrOXYzine pamoate  (VISTARIL) 25 MG Cap TAKE 1 CAPSULE BY MOUTH TWICE A DAY AS NEEDED FOR ITCHING      insulin degludec (TRESIBA FLEXTOUCH U-200) 200 unit/mL (3 mL) insulin pen Inject 80 Units into the skin once daily.  Qty: 3 pen , Refills: 2    Associated Diagnoses: Type 2 diabetes mellitus with hyperglycemia, with long-term current use of insulin      metoprolol tartrate (LOPRESSOR) 25 MG tablet Take 12.5 mg by mouth 2 (two) times a day.      pantoprazole (PROTONIX) 40 MG tablet Take 1 tablet (40 mg total) by mouth once daily.  Qty: 30 tablet, Refills: 2    Associated Diagnoses: Gastroesophageal reflux disease without esophagitis      potassium chloride (MICRO-K) 10 MEQ CpSR Take 1 pill when taking Lasix  Qty: 30 capsule, Refills: 2    Associated Diagnoses: Lower extremity edema      rosuvastatin (CRESTOR) 10 MG tablet Take 1 tablet (10 mg total) by mouth once daily.  Qty: 90 tablet, Refills: 3    Associated Diagnoses: Mixed hyperlipidemia      topiramate (TOPAMAX) 25 MG tablet Take 2 tablets by mouth 2 (two) times daily.      traZODone (DESYREL) 100 MG tablet Take 1 tablet (100 mg total) by mouth every evening.  Qty: 30 tablet, Refills: 2    Associated Diagnoses: Insomnia, unspecified type      VITAMIN D2 1,250 mcg (50,000 unit) capsule Take 1 capsule (50,000 Units total) by mouth every 7 days.  Qty: 12 capsule, Refills: 0    Associated Diagnoses: Vitamin D deficiency      XARELTO 10 mg Tab Take 1 tablet (10 mg total) by mouth once daily.  Qty: 30 tablet, Refills: 5    Associated Diagnoses: Chronic deep vein thrombosis (DVT) of femoral vein of right lower extremity      albuterol (VENTOLIN HFA) 90 mcg/actuation inhaler Inhale 2 puffs into the lungs every 6 (six) hours as needed for Wheezing. Rescue  Qty: 18 g, Refills: 0    Associated Diagnoses: Acute non-recurrent pansinusitis      ammonium lactate (LAC-HYDRIN) 12 % lotion Apply twice daily to affected areas on back as tolerated NON FACIAL  Qty: 225 g, Refills: 3    Comments:  Suggested Packagin.0 Grams bottle.      augmented betamethasone (DIPROLENE) 0.05 % lotion Apply to scalp nightly for 1 week then apply weekly for maintenance  Qty: 60 mL, Refills: 6    Comments: Suggested Packagin.0 Milliliters bottle.      baclofen (LIORESAL) 10 MG tablet Take 10 mg by mouth 3 (three) times daily.      blood sugar diagnostic Strp 1 each by Misc.(Non-Drug; Combo Route) route 2 (two) times daily.  Qty: 100 each, Refills: 11    Associated Diagnoses: Type 2 diabetes mellitus with hyperglycemia, with long-term current use of insulin      calcium citrate (CALCITRATE) 200 mg (950 mg) tablet Take 5 tablets (1,000 mg total) by mouth once daily.  Qty: 150 tablet, Refills: 0    Associated Diagnoses: Elevated parathyroid hormone      celecoxib (CELEBREX) 200 MG capsule TAKE 1 CAPSULE BY MOUTH ONCE DAILY WITH MEALS      cetirizine (ZYRTEC) 10 MG tablet Take 10 mg by mouth once daily.      clindamycin phosphate 1% (CLINDAGEL) 1 % gel APPLY TO CHEEKS AND CHIN TWICE A DAY FOR ACNE UNTIL CLEAR  Qty: 60 g, Refills: 1    Associated Diagnoses: Acne, unspecified acne type      dapagliflozin propanediol (FARXIGA) 5 mg Tab tablet Take 1 tablet (5 mg total) by mouth once daily.  Qty: 30 tablet, Refills: 2    Associated Diagnoses: Type 2 diabetes mellitus with hyperglycemia, with long-term current use of insulin      DEXCOM G6  Misc USE AS DIRECTED TO MONITOR BLOOD SUGAR  Qty: 1 each, Refills: 0    Associated Diagnoses: Type 2 diabetes mellitus with hyperglycemia, with long-term current use of insulin      DEXCOM G6 SENSOR Merari Inject 1 each into the skin every 10 days.  Qty: 3 each, Refills: 2    Associated Diagnoses: Type 2 diabetes mellitus with hyperglycemia, with long-term current use of insulin      diphenoxylate-atropine 2.5-0.025 mg (LOMOTIL) 2.5-0.025 mg per tablet Take 1 tablet by mouth 4 (four) times daily as needed.      doxycycline monohydrate 100 mg Tab Take one tablet by mouth twice a day  with food when flaring  Qty: 60 tablet, Refills: 2      fluconazole (DIFLUCAN) 150 MG Tab TAKE 1 TABLET BY MOUTH ONCE NOW THEN REPEAT IN ONE WEEK      fluticasone propionate (FLONASE) 50 mcg/actuation nasal spray 1 spray (50 mcg total) by Each Nostril route once daily.  Qty: 16 g, Refills: 0    Associated Diagnoses: Upper respiratory tract infection, unspecified type      !! FREESTYLE LANCETS 28 gauge lancets Inject 1 lancet into the skin 2 (two) times a day.  Qty: 100 each, Refills: 0    Associated Diagnoses: Type 2 diabetes mellitus with hyperglycemia, with long-term current use of insulin      glucose 4 GM chewable tablet Take 4 tablets (16 g total) by mouth as needed for Low blood sugar (If having symptoms of blurry vision, palpitations, confusion, shakiness.  Please check sugars and if sugar below 70 please take 4 tablets and re-check sugar everry 15 minutes until sugars are above 70 and symptoms resolve.).  Qty: 50 tablet, Refills: 12    Associated Diagnoses: Type 2 diabetes mellitus with hyperglycemia, with long-term current use of insulin      insulin aspart U-100 (NOVOLOG FLEXPEN U-100 INSULIN) 100 unit/mL (3 mL) InPn pen Inject 12 Units into the skin 3 (three) times daily with meals.  Qty: 9 mL, Refills: 2    Associated Diagnoses: Type 2 diabetes mellitus with hyperglycemia, with long-term current use of insulin      lactulose (CHRONULAC) 10 gram/15 mL solution Take 30 mLs (20 g total) by mouth every evening.  Qty: 473 mL, Refills: 0    Associated Diagnoses: Other constipation      !! lancets Misc 1 each by Misc.(Non-Drug; Combo Route) route 2 (two) times daily.  Qty: 100 each, Refills: 11    Associated Diagnoses: Type 2 diabetes mellitus with hyperglycemia, with long-term current use of insulin      methocarbamoL (ROBAXIN) 750 MG Tab Take 750 mg by mouth 3 (three) times daily as needed.      metroNIDAZOLE 0.75 % Lotn Apply twice daily to Rosacea red areas  Qty: 59 mL, Refills: 5    Comments: Suggested  "Packagin.0 Milliliters bottle.      mupirocin (BACTROBAN) 2 % ointment Apply topically 3 (three) times daily.  Qty: 22 g, Refills: 0    Associated Diagnoses: Travel advice encounter      nystatin (MYCOSTATIN) cream Apply topically 2 (two) times daily.  Qty: 15 g, Refills: 1    Associated Diagnoses: Fungal infection      ondansetron (ZOFRAN-ODT) 4 MG TbDL Take 1 tablet (4 mg total) by mouth every 6 (six) hours as needed (nausea).  Qty: 20 tablet, Refills: 0    Associated Diagnoses: Nausea      oxyCODONE-acetaminophen (PERCOCET)  mg per tablet Take 1 tablet by mouth 4 (four) times daily.      pen needle, diabetic (PEN NEEDLE) 31 gauge x 1/4" Ndle 1 each by Misc.(Non-Drug; Combo Route) route once daily.  Qty: 50 each, Refills: 2    Associated Diagnoses: Type 2 diabetes mellitus with hyperglycemia, with long-term current use of insulin      pimecrolimus (ELIDEL) 1 % cream Apply twice daily to rashes as needed  Qty: 30 g, Refills: 11    Comments: Suggested Packagin.0 Grams tube.      rizatriptan (MAXALT) 10 MG tablet TAKE 1 TABLET BY MOUTH AT ONSET OF MIGRAINE. MAY REPEAT DOSE ONCE AFTER 2 HOURS AS NEEDED. MAX 2 TABS IN 24 HOURS      Saccharomyces boulardii (FLORASTOR) 250 mg capsule Take 1 capsule (250 mg total) by mouth 2 (two) times daily.  Qty: 60 capsule, Refills: 0    Associated Diagnoses: Diarrhea, unspecified type      tirzepatide (MOUNJARO) 12.5 mg/0.5 mL PnIj Inject 12.5 mg into the skin every 7 days.  Qty: 4 Pen, Refills: 1    Associated Diagnoses: Type 2 diabetes mellitus with hyperglycemia, with long-term current use of insulin       !! - Potential duplicate medications found. Please discuss with provider.          Discharge Procedure Orders (must include Diet, Follow-up, Activity)   Discharge Procedure Orders (must include Diet, Follow-up, Activity)   Diet general     Other restrictions (specify):   Order Comments: Light activity x 4 weeks and pelvic rest x 12 weeks     Call MD for:  extreme " fatigue     Call MD for:  persistent dizziness or light-headedness     Call MD for:  hives     Call MD for:  redness, tenderness, or signs of infection (pain, swelling, redness, odor or green/yellow discharge around incision site)     Call MD for:  difficulty breathing, headache or visual disturbances     Call MD for:  severe uncontrolled pain     Call MD for:  persistent nausea and vomiting     Call MD for:  temperature >100.4     No dressing needed     EKG 12-lead   Standing Status: Future Number of Occurrences: 1 Standing Exp. Date: 07/01/25         Follow-up Information       Ross Medel MD Follow up in 4 week(s).    Specialty: Obstetrics and Gynecology  Why: Post-operative visit  Contact information:  [address]  [place] [address] [phone]

## 2024-07-15 NOTE — ANESTHESIA POSTPROCEDURE EVALUATION
Anesthesia Post Evaluation    Patient: Leticia Hyde    Procedure(s) Performed: Procedure(s) (LRB):  XI ROBOTIC HYSTERECTOMY,WITH SALPINGECTOMY (Bilateral)  ROBOTIC LYSIS, ADHESIONS (N/A)    Final Anesthesia Type: general      Patient location during evaluation: PACU  Patient participation: Yes- Able to Participate  Level of consciousness: awake and alert  Post-procedure vital signs: reviewed and stable  Pain management: adequate  Airway patency: patent  GEOFFREY mitigation strategies: Verification of full reversal of neuromuscular block  PONV status at discharge: No PONV  Anesthetic complications: no      Cardiovascular status: hemodynamically stable  Respiratory status: spontaneous ventilation  Hydration status: euvolemic  Follow-up not needed.              Vitals Value Taken Time   /71 07/15/24 1500   Temp 36.5 °C (97.7 °F) 07/15/24 1215   Pulse 81 07/15/24 1502   Resp 119 07/15/24 1502   SpO2 95 % 07/15/24 1502   Vitals shown include unfiled device data.      Event Time   Out of Recovery 15:04:40         Pain/Brain Score: Pain Rating Prior to Med Admin: 8 (7/15/2024  1:20 PM)  Brain Score: 10 (7/15/2024  3:05 PM)

## 2024-07-15 NOTE — DISCHARGE INSTRUCTIONS
Nozin Instructions    Goal: The goal of Nozin is to reduce the risk of post-procedural infections by reducing bacteria in the nasal cavity. Think of it as hand  for your nose.    How to use:      1. Shake Nozin bottle well    2. Take a cotton swab and apply 4 drops to one tip    3. Insert cotton tip into one nostril, being sure not to go deeper into nose than tip of the swab.    4. Swab nostril 6 times counterclockwise and 6 times clockwise. Make sure to swab the inside front pocket of the nostril.    5. Take swab out and apply 2 drops to the same cotton tip. Repeat steps 3 and 4 in the other nostril.       Do steps 1-5 at least twice a day for 7 days, or longer if desired.

## 2024-07-18 LAB
FINAL PATHOLOGIC DIAGNOSIS: NORMAL
GROSS: NORMAL
Lab: NORMAL

## 2024-07-21 ENCOUNTER — NURSE TRIAGE (OUTPATIENT)
Dept: ADMINISTRATIVE | Facility: CLINIC | Age: 39
End: 2024-07-21
Payer: COMMERCIAL

## 2024-07-21 ENCOUNTER — HOSPITAL ENCOUNTER (EMERGENCY)
Facility: HOSPITAL | Age: 39
Discharge: HOME | End: 2024-07-22
Attending: EMERGENCY MEDICINE
Payer: COMMERCIAL

## 2024-07-21 DIAGNOSIS — R10.30 LOWER ABDOMINAL PAIN: Primary | ICD-10-CM

## 2024-07-21 DIAGNOSIS — R31.9 URINARY TRACT INFECTION WITH HEMATURIA, SITE UNSPECIFIED: ICD-10-CM

## 2024-07-21 DIAGNOSIS — N39.0 URINARY TRACT INFECTION WITH HEMATURIA, SITE UNSPECIFIED: ICD-10-CM

## 2024-07-21 DIAGNOSIS — R19.7 DIARRHEA, UNSPECIFIED TYPE: ICD-10-CM

## 2024-07-21 LAB
ALBUMIN SERPL BCP-MCNC: 3.8 G/DL (ref 3.5–5.2)
ALP SERPL-CCNC: 75 U/L (ref 55–135)
ALT SERPL W/O P-5'-P-CCNC: 17 U/L (ref 10–44)
ANION GAP SERPL CALC-SCNC: 13 MMOL/L (ref 8–16)
AST SERPL-CCNC: 12 U/L (ref 10–40)
BACTERIA #/AREA URNS HPF: ABNORMAL /HPF
BASOPHILS # BLD AUTO: 0.05 K/UL (ref 0–0.2)
BASOPHILS NFR BLD: 0.5 % (ref 0–1.9)
BILIRUB SERPL-MCNC: 0.5 MG/DL (ref 0.1–1)
BILIRUB UR QL STRIP: NEGATIVE
BUN SERPL-MCNC: 12 MG/DL (ref 6–20)
CALCIUM SERPL-MCNC: 10.2 MG/DL (ref 8.7–10.5)
CHLORIDE SERPL-SCNC: 108 MMOL/L (ref 95–110)
CLARITY UR: ABNORMAL
CO2 SERPL-SCNC: 17 MMOL/L (ref 23–29)
COLOR UR: YELLOW
CREAT SERPL-MCNC: 0.9 MG/DL (ref 0.5–1.4)
DIFFERENTIAL METHOD BLD: ABNORMAL
EOSINOPHIL # BLD AUTO: 0.4 K/UL (ref 0–0.5)
EOSINOPHIL NFR BLD: 4.3 % (ref 0–8)
ERYTHROCYTE [DISTWIDTH] IN BLOOD BY AUTOMATED COUNT: 19.8 % (ref 11.5–14.5)
EST. GFR  (NO RACE VARIABLE): >60 ML/MIN/1.73 M^2
GLUCOSE SERPL-MCNC: 113 MG/DL (ref 70–110)
GLUCOSE UR QL STRIP: NEGATIVE
HCT VFR BLD AUTO: 43.9 % (ref 37–48.5)
HGB BLD-MCNC: 14.3 G/DL (ref 12–16)
HGB UR QL STRIP: NEGATIVE
HYALINE CASTS #/AREA URNS LPF: 26 /LPF
IMM GRANULOCYTES # BLD AUTO: 0.04 K/UL (ref 0–0.04)
IMM GRANULOCYTES NFR BLD AUTO: 0.4 % (ref 0–0.5)
KETONES UR QL STRIP: ABNORMAL
LEUKOCYTE ESTERASE UR QL STRIP: ABNORMAL
LYMPHOCYTES # BLD AUTO: 3.5 K/UL (ref 1–4.8)
LYMPHOCYTES NFR BLD: 34.5 % (ref 18–48)
MAGNESIUM SERPL-MCNC: 2 MG/DL (ref 1.6–2.6)
MCH RBC QN AUTO: 28 PG (ref 27–31)
MCHC RBC AUTO-ENTMCNC: 32.6 G/DL (ref 32–36)
MCV RBC AUTO: 86 FL (ref 82–98)
MICROSCOPIC COMMENT: ABNORMAL
MONOCYTES # BLD AUTO: 0.6 K/UL (ref 0.3–1)
MONOCYTES NFR BLD: 6.2 % (ref 4–15)
NEUTROPHILS # BLD AUTO: 5.5 K/UL (ref 1.8–7.7)
NEUTROPHILS NFR BLD: 54.1 % (ref 38–73)
NITRITE UR QL STRIP: NEGATIVE
NRBC BLD-RTO: 0 /100 WBC
PH UR STRIP: 6 [PH] (ref 5–8)
PLATELET # BLD AUTO: 359 K/UL (ref 150–450)
PMV BLD AUTO: 10.4 FL (ref 9.2–12.9)
POTASSIUM SERPL-SCNC: 3.8 MMOL/L (ref 3.5–5.1)
PROT SERPL-MCNC: 9.3 G/DL (ref 6–8.4)
PROT UR QL STRIP: ABNORMAL
RBC # BLD AUTO: 5.11 M/UL (ref 4–5.4)
RBC #/AREA URNS HPF: 2 /HPF (ref 0–4)
SODIUM SERPL-SCNC: 138 MMOL/L (ref 136–145)
SP GR UR STRIP: 1.02 (ref 1–1.03)
SQUAMOUS #/AREA URNS HPF: 3 /HPF
URN SPEC COLLECT METH UR: ABNORMAL
UROBILINOGEN UR STRIP-ACNC: NEGATIVE EU/DL
WBC # BLD AUTO: 10.14 K/UL (ref 3.9–12.7)
WBC #/AREA URNS HPF: 10 /HPF (ref 0–5)

## 2024-07-21 PROCEDURE — 99285 EMERGENCY DEPT VISIT HI MDM: CPT | Mod: 25

## 2024-07-21 PROCEDURE — 85025 COMPLETE CBC W/AUTO DIFF WBC: CPT | Performed by: EMERGENCY MEDICINE

## 2024-07-21 PROCEDURE — 83735 ASSAY OF MAGNESIUM: CPT | Performed by: EMERGENCY MEDICINE

## 2024-07-21 PROCEDURE — 25500020 PHARM REV CODE 255: Performed by: EMERGENCY MEDICINE

## 2024-07-21 PROCEDURE — 80053 COMPREHEN METABOLIC PANEL: CPT | Performed by: EMERGENCY MEDICINE

## 2024-07-21 PROCEDURE — 25000003 PHARM REV CODE 250: Performed by: EMERGENCY MEDICINE

## 2024-07-21 PROCEDURE — 96374 THER/PROPH/DIAG INJ IV PUSH: CPT | Mod: 59

## 2024-07-21 PROCEDURE — 96376 TX/PRO/DX INJ SAME DRUG ADON: CPT

## 2024-07-21 PROCEDURE — 96375 TX/PRO/DX INJ NEW DRUG ADDON: CPT

## 2024-07-21 PROCEDURE — 81000 URINALYSIS NONAUTO W/SCOPE: CPT | Performed by: EMERGENCY MEDICINE

## 2024-07-21 PROCEDURE — 96361 HYDRATE IV INFUSION ADD-ON: CPT

## 2024-07-21 PROCEDURE — 63600175 PHARM REV CODE 636 W HCPCS: Performed by: EMERGENCY MEDICINE

## 2024-07-21 RX ORDER — CIPROFLOXACIN 500 MG/1
500 TABLET ORAL 2 TIMES DAILY
Qty: 14 TABLET | Refills: 0 | Status: SHIPPED | OUTPATIENT
Start: 2024-07-21 | End: 2024-07-28

## 2024-07-21 RX ORDER — MORPHINE SULFATE 4 MG/ML
4 INJECTION, SOLUTION INTRAMUSCULAR; INTRAVENOUS
Status: COMPLETED | OUTPATIENT
Start: 2024-07-21 | End: 2024-07-21

## 2024-07-21 RX ORDER — HYDROCODONE BITARTRATE AND ACETAMINOPHEN 5; 325 MG/1; MG/1
1 TABLET ORAL EVERY 6 HOURS PRN
Qty: 12 TABLET | Refills: 0 | Status: SHIPPED | OUTPATIENT
Start: 2024-07-21

## 2024-07-21 RX ORDER — ONDANSETRON HYDROCHLORIDE 2 MG/ML
4 INJECTION, SOLUTION INTRAVENOUS
Status: COMPLETED | OUTPATIENT
Start: 2024-07-21 | End: 2024-07-21

## 2024-07-21 RX ORDER — LIDOCAINE HYDROCHLORIDE 20 MG/ML
15 SOLUTION OROPHARYNGEAL ONCE
Status: COMPLETED | OUTPATIENT
Start: 2024-07-22 | End: 2024-07-21

## 2024-07-21 RX ORDER — ALUMINUM HYDROXIDE, MAGNESIUM HYDROXIDE, AND SIMETHICONE 1200; 120; 1200 MG/30ML; MG/30ML; MG/30ML
30 SUSPENSION ORAL ONCE
Status: COMPLETED | OUTPATIENT
Start: 2024-07-22 | End: 2024-07-21

## 2024-07-21 RX ORDER — ONDANSETRON 4 MG/1
4 TABLET, ORALLY DISINTEGRATING ORAL EVERY 8 HOURS PRN
Qty: 12 TABLET | Refills: 0 | Status: SHIPPED | OUTPATIENT
Start: 2024-07-21

## 2024-07-21 RX ADMIN — SODIUM CHLORIDE 1000 ML: 9 INJECTION, SOLUTION INTRAVENOUS at 11:07

## 2024-07-21 RX ADMIN — LIDOCAINE HYDROCHLORIDE 15 ML: 20 SOLUTION ORAL at 11:07

## 2024-07-21 RX ADMIN — ONDANSETRON 4 MG: 2 INJECTION INTRAMUSCULAR; INTRAVENOUS at 08:07

## 2024-07-21 RX ADMIN — MORPHINE SULFATE 4 MG: 4 INJECTION INTRAVENOUS at 11:07

## 2024-07-21 RX ADMIN — MORPHINE SULFATE 4 MG: 4 INJECTION INTRAVENOUS at 08:07

## 2024-07-21 RX ADMIN — IOHEXOL 100 ML: 350 INJECTION, SOLUTION INTRAVENOUS at 10:07

## 2024-07-21 RX ADMIN — SODIUM CHLORIDE 1000 ML: 9 INJECTION, SOLUTION INTRAVENOUS at 08:07

## 2024-07-21 RX ADMIN — ALUMINUM HYDROXIDE, MAGNESIUM HYDROXIDE, AND SIMETHICONE 30 ML: 1200; 120; 1200 SUSPENSION ORAL at 11:07

## 2024-07-21 NOTE — TELEPHONE ENCOUNTER
Pt had a hysterectomy on Monday 7/15, now with diarrhea for 2 days and worsening.  Care advice states to go to the ED now.  Patient verbally understands, all questions answered, advised to call back for any worsening symptoms or further needs.     Reason for Disposition   [1] SEVERE abdominal pain (e.g., excruciating) AND [2] present > 1 hour    Additional Information   Negative: Shock suspected (e.g., cold/pale/clammy skin, too weak to stand, low BP, rapid pulse)   Negative: Difficult to awaken or acting confused (e.g., disoriented, slurred speech)   Negative: Sounds like a life-threatening emergency to the triager    Protocols used: Diarrhea-A-AH

## 2024-07-22 ENCOUNTER — TELEPHONE (OUTPATIENT)
Dept: OBSTETRICS AND GYNECOLOGY | Facility: CLINIC | Age: 39
End: 2024-07-22
Payer: COMMERCIAL

## 2024-07-22 VITALS
HEART RATE: 106 BPM | OXYGEN SATURATION: 95 % | RESPIRATION RATE: 20 BRPM | TEMPERATURE: 99 F | DIASTOLIC BLOOD PRESSURE: 67 MMHG | SYSTOLIC BLOOD PRESSURE: 113 MMHG | WEIGHT: 257.94 LBS | BODY MASS INDEX: 42.92 KG/M2

## 2024-07-22 PROCEDURE — 25000003 PHARM REV CODE 250: Performed by: EMERGENCY MEDICINE

## 2024-07-22 NOTE — TELEPHONE ENCOUNTER
Pt called in regards to ER visit for Lower Abdominal Pain. Needed follow up visit with Gianfranco Martin LPN  OB/GYN

## 2024-07-22 NOTE — ED PROVIDER NOTES
SCRIBE #1 NOTE: I, Wally Romeo, am scribing for, and in the presence of, Curtis oBwers DO. I have scribed the entire note.       History     Chief Complaint   Patient presents with    Abdominal Pain     Lower abdominal pain with diarrhea x 3 days. Hysterectomy 6 days ago.      Review of patient's allergies indicates:   Allergen Reactions    Pcn [penicillins]          History of Present Illness     HPI    2024, 7:29 PM  History obtained from the patient      History of Present Illness: Leticia Hyde is a 39 y.o. female patient with a PMHx of anemia, CVA, DM2, DVT, GERD, and migraines who presents to the Emergency Department for evaluation of bilateral lower quadrant abdominal pain which onset 3 days ago. Pt had a robotic hysterectomy procedure on 7/15/2024. Pt states the pain is mainly on the lower region. Symptoms are constant and moderate in severity. No mitigating or exacerbating factors reported. Associated sxs include myalgias, nausea, and diarrhea. Patient states that she multiple episodes of diarrhea yesterday. Patient denies any fever, CP, SOB, vomiting, vaginal bleeding, weakness, and all other sxs at this time. No prior Tx reported. No further complaints or concerns at this time.       Arrival mode: Personal vehicle     PCP: Dustin Hua MD        Past Medical History:  Past Medical History:   Diagnosis Date    Anemia     Back pain     CVA (cerebral vascular accident)     Diabetes mellitus, type 2     DVT (deep venous thrombosis)     GERD (gastroesophageal reflux disease)     Migraines     GEOFFREY (obstructive sleep apnea)        Past Surgical History:  Past Surgical History:   Procedure Laterality Date     SECTION      CHOLECYSTECTOMY      ENDOMETRIAL ABLATION      OVARIAN CYST REMOVAL      ROBOT-ASSISTED LYSIS OF ADHESIONS N/A 7/15/2024    Procedure: ROBOTIC LYSIS, ADHESIONS;  Surgeon: Ross Medel MD;  Location: [facility];  Service: OB/GYN;  Laterality: N/A;     TUBAL LIGATION      XI ROBOTIC HYSTERECTOMY, WITH SALPINGECTOMY Bilateral 7/15/2024    Procedure: XI ROBOTIC HYSTERECTOMY,WITH SALPINGECTOMY;  Surgeon: Ross Medel MD;  Location: [facility];  Service: OB/GYN;  Laterality: Bilateral;         Family History:  Family History   Problem Relation Name Age of Onset    Hypertension Mother      Diabetes type II Mother         Social History:  Social History     Tobacco Use    Smoking status: Never    Smokeless tobacco: Never   Substance and Sexual Activity    Alcohol use: Yes     Comment: social    Drug use: Yes     Types: Marijuana     Comment: medical    Sexual activity: Yes     Partners: Male        Review of Systems     Review of Systems   Constitutional:  Negative for fever.   HENT:  Negative for sore throat.    Respiratory:  Negative for shortness of breath.    Cardiovascular:  Negative for chest pain.   Gastrointestinal:  Positive for abdominal pain (bilateral lower quadrant), diarrhea and nausea. Negative for vomiting.   Genitourinary:  Negative for dysuria and vaginal bleeding.   Musculoskeletal:  Positive for myalgias. Negative for back pain.   Skin:  Negative for rash.   Neurological:  Negative for weakness.   Hematological:  Does not bruise/bleed easily.   All other systems reviewed and are negative.     Physical Exam     Initial Vitals [07/21/24 1925]   BP Pulse Resp Temp SpO2   110/78 (!) 127 18 98.9 °F (37.2 °C) 99 %      MAP       --          Physical Exam  Vitals reviewed.   Cardiovascular:      Comments: Tachycardic rate, regular rhythm, no murmurs noted  Pulmonary:      Effort: Pulmonary effort is normal.      Breath sounds: No wheezing.   Abdominal:      Comments: Abdomen is soft, there is tenderness to palpation to the bilateral lower quadrant with no rigidity and no distention   Musculoskeletal:         General: Normal range of motion.   Skin:     General: Skin is warm and dry.      Comments: Laparoscopic incisions are clean dry and intact with no  signs of infection   Neurological:      General: No focal deficit present.      Mental Status: She is alert and oriented to person, place, and time.      Sensory: No sensory deficit.      Motor: No weakness.            ED Course   Procedures  ED Vital Signs:  Vitals:    07/21/24 1925 07/21/24 2049 07/21/24 2100 07/21/24 2102   BP: 110/78   132/78   Pulse: (!) 127  (!) 111 107   Resp: 18 19  14   Temp: 98.9 °F (37.2 °C)      SpO2: 99%   100%   Weight: 117 kg (257 lb 15 oz)       07/21/24 2218 07/21/24 2302   BP: 117/67    Pulse: 103    Resp: 13 18   Temp:     SpO2: 99%    Weight:         Abnormal Lab Results:  Labs Reviewed   CBC W/ AUTO DIFFERENTIAL - Abnormal       Result Value    WBC 10.14      RBC 5.11      Hemoglobin 14.3      Hematocrit 43.9      MCV 86      MCH 28.0      MCHC 32.6      RDW 19.8 (*)     Platelets 359      MPV 10.4      Immature Granulocytes 0.4      Gran # (ANC) 5.5      Immature Grans (Abs) 0.04      Lymph # 3.5      Mono # 0.6      Eos # 0.4      Baso # 0.05      nRBC 0      Gran % 54.1      Lymph % 34.5      Mono % 6.2      Eosinophil % 4.3      Basophil % 0.5      Differential Method Automated     COMPREHENSIVE METABOLIC PANEL - Abnormal    Sodium 138      Potassium 3.8      Chloride 108      CO2 17 (*)     Glucose 113 (*)     BUN 12      Creatinine 0.9      Calcium 10.2      Total Protein 9.3 (*)     Albumin 3.8      Total Bilirubin 0.5      Alkaline Phosphatase 75      AST 12      ALT 17      eGFR >60      Anion Gap 13     URINALYSIS, REFLEX TO URINE CULTURE - Abnormal    Specimen UA Urine, Clean Catch      Color, UA Yellow      Appearance, UA Hazy (*)     pH, UA 6.0      Specific Gravity, UA 1.025      Protein, UA 1+ (*)     Glucose, UA Negative      Ketones, UA Trace (*)     Bilirubin (UA) Negative      Occult Blood UA Negative      Nitrite, UA Negative      Urobilinogen, UA Negative      Leukocytes, UA 1+ (*)     Narrative:     Specimen Source->Urine   URINALYSIS MICROSCOPIC - Abnormal     RBC, UA 2      WBC, UA 10 (*)     Bacteria Occasional      Squam Epithel, UA 3      Hyaline Casts, UA 26 (*)     Microscopic Comment SEE COMMENT      Narrative:     Specimen Source->Urine   MAGNESIUM    Magnesium 2.0          All Lab Results:  Results for orders placed or performed during the hospital encounter of 07/21/24   CBC W/ AUTO DIFFERENTIAL   Result Value Ref Range    WBC 10.14 3.90 - 12.70 K/uL    RBC 5.11 4.00 - 5.40 M/uL    Hemoglobin 14.3 12.0 - 16.0 g/dL    Hematocrit 43.9 37.0 - 48.5 %    MCV 86 82 - 98 fL    MCH 28.0 27.0 - 31.0 pg    MCHC 32.6 32.0 - 36.0 g/dL    RDW 19.8 (H) 11.5 - 14.5 %    Platelets 359 150 - 450 K/uL    MPV 10.4 9.2 - 12.9 fL    Immature Granulocytes 0.4 0.0 - 0.5 %    Gran # (ANC) 5.5 1.8 - 7.7 K/uL    Immature Grans (Abs) 0.04 0.00 - 0.04 K/uL    Lymph # 3.5 1.0 - 4.8 K/uL    Mono # 0.6 0.3 - 1.0 K/uL    Eos # 0.4 0.0 - 0.5 K/uL    Baso # 0.05 0.00 - 0.20 K/uL    nRBC 0 0 /100 WBC    Gran % 54.1 38.0 - 73.0 %    Lymph % 34.5 18.0 - 48.0 %    Mono % 6.2 4.0 - 15.0 %    Eosinophil % 4.3 0.0 - 8.0 %    Basophil % 0.5 0.0 - 1.9 %    Differential Method Automated    Comp. Metabolic Panel   Result Value Ref Range    Sodium 138 136 - 145 mmol/L    Potassium 3.8 3.5 - 5.1 mmol/L    Chloride 108 95 - 110 mmol/L    CO2 17 (L) 23 - 29 mmol/L    Glucose 113 (H) 70 - 110 mg/dL    BUN 12 6 - 20 mg/dL    Creatinine 0.9 0.5 - 1.4 mg/dL    Calcium 10.2 8.7 - 10.5 mg/dL    Total Protein 9.3 (H) 6.0 - 8.4 g/dL    Albumin 3.8 3.5 - 5.2 g/dL    Total Bilirubin 0.5 0.1 - 1.0 mg/dL    Alkaline Phosphatase 75 55 - 135 U/L    AST 12 10 - 40 U/L    ALT 17 10 - 44 U/L    eGFR >60 >60 mL/min/1.73 m^2    Anion Gap 13 8 - 16 mmol/L   Urinalysis, Reflex to Urine Culture Urine, Clean Catch    Specimen: Urine   Result Value Ref Range    Specimen UA Urine, Clean Catch     Color, UA Yellow Yellow, Straw, Jessy    Appearance, UA Hazy (A) Clear    pH, UA 6.0 5.0 - 8.0    Specific Gravity, UA 1.025 1.005 -  1.030    Protein, UA 1+ (A) Negative    Glucose, UA Negative Negative    Ketones, UA Trace (A) Negative    Bilirubin (UA) Negative Negative    Occult Blood UA Negative Negative    Nitrite, UA Negative Negative    Urobilinogen, UA Negative <2.0 EU/dL    Leukocytes, UA 1+ (A) Negative   Magnesium   Result Value Ref Range    Magnesium 2.0 1.6 - 2.6 mg/dL   Urinalysis Microscopic   Result Value Ref Range    RBC, UA 2 0 - 4 /hpf    WBC, UA 10 (H) 0 - 5 /hpf    Bacteria Occasional None-Occ /hpf    Squam Epithel, UA 3 /hpf    Hyaline Casts, UA 26 (A) 0-1/lpf /lpf    Microscopic Comment SEE COMMENT          Imaging Results:  Imaging Results              CT Abdomen Pelvis With IV Contrast NO Oral Contrast (Final result)  Result time 07/21/24 22:33:17      Final result by Armin Goldsmith MD (07/21/24 22:33:17)                   Impression:      Segmentally dilated small bowel loops in the left lateral hemiabdomen measuring up to 3.1 cm in diameter.  No other evidence for bowel obstruction.  Recommend follow-up.    No adjacent fat mild Tracy appendiceal enhancement.  No appendiceal enlargement.  No adjacent fat stranding    All CT scans at this facility use dose modulation, iterative reconstruction, and/or weight based dosing when appropriate to reduce radiation dose to as low as reasonably achievable.      Electronically signed by: Armin Goldsmith  Date:    07/21/2024  Time:    22:33               Narrative:    EXAMINATION:  CT ABDOMEN PELVIS WITH IV CONTRAST    CLINICAL HISTORY:  LLQ abdominal pain;RLQ abdominal pain (Age >= 14y);blq abdominal pain with diarrhea s/p hysterectomy 7/15/2024;    TECHNIQUE:  Low dose axial images, sagittal and coronal reformations were obtained from the lung bases to the pubic symphysis following the IV administration of 100 mL of Omnipaque 350.    COMPARISON:  None    FINDINGS:  Heart: Normal size as far as seen. No effusion as far as seen.    Lung Bases: Clear.    Liver: Hepatomegaly with fatty  infiltration liver..  No focal lesions.    Gallbladder: Status post cholecystectomy.    Bile Ducts: No dilatation.    Pancreas: No mass. No peripancreatic fat stranding.    Spleen: Normal.    Adrenals: Normal.    Kidneys/Ureters: Normal enhancement.  No mass or  hydroureteronephrosis.    Bladder: No wall thickening.    Reproductive organs: Right ovarian follicle.  Status post hysterectomy.  No adjacent fat mild Tracy appendiceal enhancement.  No appendiceal enlargement.  No adjacent fat stranding    GI Tract/Mesentery: Segmentally dilated small bowel loops in the left lateral hemiabdomen measuring up to 3.1 cm in diameter.  No other evidence for bowel obstruction.  No evidence of acute appendicitis.    Peritoneal Space: No ascites or free air.    Retroperitoneum: No significant adenopathy.    Abdominal wall: Normal.    Vasculature: No aneurysm.    Bones: No acute fracture. No suspicious lytic or sclerotic lesions.                                                The Emergency Provider reviewed the vital signs and test results, which are outlined above.     ED Discussion       11:11 PM: Discussed pt's case with Dr. Margaret Mckeon MD (OBGYN) reviewed all labs, vitals and CT findings,  recommends discharge and agrees with plan for patient to follow-up.      11:13 PM: Reassessed pt at this time. Discussed with pt all pertinent ED information and results. Discussed pt dx and plan of tx. Gave pt all f/u and return to the ED instructions. All questions and concerns were addressed at this time. Pt expresses understanding of information and instructions, and is comfortable with plan to discharge. Pt is stable for discharge.    I discussed with patient and/or family/caretaker that evaluation in the ED does not suggest any emergent or life threatening medical conditions requiring immediate intervention beyond what was provided in the ED, and I believe patient is safe for discharge.  Regardless, an unremarkable evaluation in the  ED does not preclude the development or presence of a serious of life threatening condition. As such, patient was instructed to return immediately for any worsening or change in current symptoms.        ED Course as of 07/21/24 2348   Sun Jul 21, 2024 2122 CBC W/ AUTO DIFFERENTIAL(!)  Nonspecific findings [CD]   2142 Comp. Metabolic Panel(!)  Nonspecific findings [CD]   2142 Magnesium  Within normal limit [CD]   2204 Urinalysis, Reflex to Urine Culture Urine, Clean Catch(!)  uti [CD]   2241 CT Abdomen Pelvis With IV Contrast NO Oral Contrast  Heart: Normal size as far as seen. No effusion as far as seen.     Lung Bases: Clear.     Liver: Hepatomegaly with fatty infiltration liver..  No focal lesions.     Gallbladder: Status post cholecystectomy.     Bile Ducts: No dilatation.     Pancreas: No mass. No peripancreatic fat stranding.     Spleen: Normal.     Adrenals: Normal.     Kidneys/Ureters: Normal enhancement.  No mass or  hydroureteronephrosis.     Bladder: No wall thickening.     Reproductive organs: Right ovarian follicle.  Status post hysterectomy.  No adjacent fat mild Tracy appendiceal enhancement.  No appendiceal enlargement.  No adjacent fat stranding     GI Tract/Mesentery: Segmentally dilated small bowel loops in the left lateral hemiabdomen measuring up to 3.1 cm in diameter.  No other evidence for bowel obstruction.  No evidence of acute appendicitis.     Peritoneal Space: No ascites or free air.     Retroperitoneum: No significant adenopathy.     Abdominal wall: Normal.     Vasculature: No aneurysm.     Bones: No acute fracture. No suspicious lytic or sclerotic lesions.     Impression:     Segmentally dilated small bowel loops in the left lateral hemiabdomen measuring up to 3.1 cm in diameter.  No other evidence for bowel obstruction.  Recommend follow-up.     No adjacent fat mild Tracy appendiceal enhancement.  No appendiceal enlargement.  No adjacent fat stranding     All CT scans at this facility  use dose modulation, iterative reconstruction, and/or weight based dosing when appropriate to reduce radiation dose to as low as reasonably achievable.   [CD]      ED Course User Index  [CD] Curtis Bowers, DO     Medical Decision Making  Pain has resolved and she is tolerating fluids at the time of discharge.  Instructed to return immediately for any new or worsening symptoms and she verbalized understanding.    Amount and/or Complexity of Data Reviewed  External Data Reviewed: notes.     Details: Patient is POD #6 from a robot assisted laparoscopic hysterectomy, bilateral salpingectomy, and lysis of adhesions for 25 minutes with Dr Medel.  Labs: ordered. Decision-making details documented in ED Course.  Radiology: ordered and independent interpretation performed. Decision-making details documented in ED Course.    Risk  OTC drugs.  Prescription drug management.  Risk Details: Differential diagnosis includes but is not limited to:  UTI, enteritis, ileus, bowel obstruction, abscess, electrolyte abnormality                ED Medication(s):  Medications   sodium chloride 0.9% bolus 1,000 mL 1,000 mL (1,000 mLs Intravenous New Bag 7/21/24 2301)   sodium chloride 0.9% bolus 1,000 mL 1,000 mL (0 mLs Intravenous Stopped 7/21/24 2150)   morphine injection 4 mg (4 mg Intravenous Given 7/21/24 2049)   ondansetron injection 4 mg (4 mg Intravenous Given 7/21/24 2048)   iohexoL (OMNIPAQUE 350) injection 100 mL (100 mLs Intravenous Given 7/21/24 2204)   morphine injection 4 mg (4 mg Intravenous Given 7/21/24 2302)   aluminum-magnesium hydroxide-simethicone 200-200-20 mg/5 mL suspension 30 mL (30 mLs Oral Given 7/21/24 2330)     And   LIDOcaine viscous HCl 2% oral solution 15 mL (15 mLs Oral Given 7/21/24 2330)       New Prescriptions    CIPROFLOXACIN HCL (CIPRO) 500 MG TABLET    Take 1 tablet (500 mg total) by mouth 2 (two) times daily. for 7 days    HYDROCODONE-ACETAMINOPHEN (NORCO) 5-325 MG PER TABLET    Take 1 tablet  by mouth every 6 (six) hours as needed for Pain.    ONDANSETRON (ZOFRAN-ODT) 4 MG TBDL    Take 1 tablet (4 mg total) by mouth every 8 (eight) hours as needed (nausea/vomiting).        Follow-up Information       Schedule an appointment as soon as possible for a visit  with Ross Medel MD.    Specialty: Obstetrics and Gynecology  Contact information:  [address]  [place] [address]  [phone]               Schedule an appointment as soon as possible for a visit  with Dustin Hua MD.    Specialty: Family Medicine  Contact information:  [address] [facility] [address]  [phone]                                 Scribe Attestation:   Scribe #1: I performed the above scribed service and the documentation accurately describes the services I performed. I attest to the accuracy of the note.     Attending:   Physician Attestation Statement for Scribe #1: I, Curtis Bowers DO, personally performed the services described in this documentation, as scribed by Wally Romeo, in my presence, and it is both accurate and complete.           Clinical Impression       ICD-10-CM ICD-9-CM   1. Lower abdominal pain  R10.30 789.09   2. Diarrhea, unspecified type  R19.7 787.91   3. Urinary tract infection with hematuria, site unspecified  N39.0 599.0    R31.9 599.70       Disposition:   Disposition: Discharged  Condition: Stable             Curtis Bowers DO  07/24/24 6686

## 2024-07-22 NOTE — TELEPHONE ENCOUNTER
----- Message from Belen Garcia sent at 7/22/2024 10:52 AM CDT -----  Contact: Patient  Type:  Sooner Apoointment Request    Caller is requesting a sooner appointment.  Caller declined first available appointment listed below.  Caller will not accept being placed on the waitlist and is requesting a message be sent to doctor.  Name of Caller:Leticia Hyde   When is the first available appointment? 8/15  Symptoms: ER f/u, post surgery complications   Would the patient rather a call back or a response via [facility]?  Call   Best Call Back Number:[phone]  Additional Information:

## 2024-07-23 ENCOUNTER — OFFICE VISIT (OUTPATIENT)
Dept: OBSTETRICS AND GYNECOLOGY | Facility: CLINIC | Age: 39
End: 2024-07-23
Payer: COMMERCIAL

## 2024-07-23 VITALS — WEIGHT: 259.69 LBS | BODY MASS INDEX: 43.27 KG/M2 | HEIGHT: 65 IN

## 2024-07-23 DIAGNOSIS — Z90.710 STATUS POST LAPAROSCOPIC HYSTERECTOMY: Primary | ICD-10-CM

## 2024-07-23 PROCEDURE — 3044F HG A1C LEVEL LT 7.0%: CPT | Mod: CPTII,S$GLB,, | Performed by: OBSTETRICS & GYNECOLOGY

## 2024-07-23 PROCEDURE — 1159F MED LIST DOCD IN RCRD: CPT | Mod: CPTII,S$GLB,, | Performed by: OBSTETRICS & GYNECOLOGY

## 2024-07-23 PROCEDURE — 1160F RVW MEDS BY RX/DR IN RCRD: CPT | Mod: CPTII,S$GLB,, | Performed by: OBSTETRICS & GYNECOLOGY

## 2024-07-23 PROCEDURE — 99024 POSTOP FOLLOW-UP VISIT: CPT | Mod: S$GLB,,, | Performed by: OBSTETRICS & GYNECOLOGY

## 2024-07-23 PROCEDURE — 99999 PR PBB SHADOW E&M-EST. PATIENT-LVL IV: CPT | Mod: PBBFAC,,, | Performed by: OBSTETRICS & GYNECOLOGY

## 2024-07-23 NOTE — PROGRESS NOTES
Subjective     Patient ID: Leticia Hyde is a 39 y.o. female.    Chief Complaint:  Post-op Evaluation      History of Present Illness  HPI  Pt is s/p  RALH/Bilateral Salpingectomy/CHRSI on 07/15/2024.  Pt is here for to follow up on recent ER visit.  Pt reported to the ER with worsening diarrhea.  Had regular BM for several days after surgery but then started having diarrhea.  Pt is doing better now but still with some diarrhea.  Pains are well controlled and reports no vaginal bleeding.  Reports normal bladder function.  Denies intercourse or strenuous activity since surgery.  Denies fevers.      GYN & OB History  Patient's last menstrual period was 06/15/2024 (exact date).   Date of Last Pap: 2024    OB History    Para Term  AB Living   4 4 4     4   SAB IAB Ectopic Multiple Live Births           4      # Outcome Date GA Lbr Bentley/2nd Weight Sex Type Anes PTL Lv   4 Term  40w0d    CS-LTranv Spinal     3 Term  40w0d    CS-Unspec Spinal     2 Term  42w0d    Vag-Spont EPI     1 Term  42w0d    Vag-Spont EPI         Review of Systems  Review of Systems   Constitutional:  Negative for activity change, appetite change, chills, fatigue, fever and unexpected weight change.   Respiratory:  Negative for shortness of breath.    Cardiovascular:  Negative for chest pain, palpitations and leg swelling.   Gastrointestinal:  Positive for abdominal pain and diarrhea. Negative for bloating, blood in stool, constipation, nausea and vomiting.   Genitourinary:  Negative for dysuria, flank pain, frequency, genital sores, hematuria, pelvic pain, urgency, vaginal bleeding, vaginal discharge, vaginal pain, urinary incontinence, vaginal dryness and vaginal odor.   Musculoskeletal:  Negative for back pain.   Neurological:  Negative for syncope and headaches.          Objective   Physical Exam:   Constitutional: She is oriented to person, place, and time. She appears well-developed and well-nourished. No distress.        Cardiovascular:  Normal rate and regular rhythm.             Pulmonary/Chest: Effort normal and breath sounds normal.        Abdominal: Soft. She exhibits abdominal incision (all wounds healing well). She exhibits no distension and no mass. There is no abdominal tenderness. There is no rebound and no guarding. No hernia.                 Neurological: She is alert and oriented to person, place, and time.    Skin: Skin is warm and dry.    Psychiatric: She has a normal mood and affect. Her behavior is normal. Thought content normal.        CT Abdomen Pelvis With IV Contrast NO Oral Contrast  Narrative: EXAMINATION:  CT ABDOMEN PELVIS WITH IV CONTRAST    CLINICAL HISTORY:  LLQ abdominal pain;RLQ abdominal pain (Age >= 14y);blq abdominal pain with diarrhea s/p hysterectomy 7/15/2024;    TECHNIQUE:  Low dose axial images, sagittal and coronal reformations were obtained from the lung bases to the pubic symphysis following the IV administration of 100 mL of Omnipaque 350.    COMPARISON:  None    FINDINGS:  Heart: Normal size as far as seen. No effusion as far as seen.    Lung Bases: Clear.    Liver: Hepatomegaly with fatty infiltration liver..  No focal lesions.    Gallbladder: Status post cholecystectomy.    Bile Ducts: No dilatation.    Pancreas: No mass. No peripancreatic fat stranding.    Spleen: Normal.    Adrenals: Normal.    Kidneys/Ureters: Normal enhancement.  No mass or  hydroureteronephrosis.    Bladder: No wall thickening.    Reproductive organs: Right ovarian follicle.  Status post hysterectomy.  No adjacent fat mild Tracy appendiceal enhancement.  No appendiceal enlargement.  No adjacent fat stranding    GI Tract/Mesentery: Segmentally dilated small bowel loops in the left lateral hemiabdomen measuring up to 3.1 cm in diameter.  No other evidence for bowel obstruction.  No evidence of acute appendicitis.    Peritoneal Space: No ascites or free air.    Retroperitoneum: No significant adenopathy.    Abdominal  wall: Normal.    Vasculature: No aneurysm.    Bones: No acute fracture. No suspicious lytic or sclerotic lesions.  Impression: Segmentally dilated small bowel loops in the left lateral hemiabdomen measuring up to 3.1 cm in diameter.  No other evidence for bowel obstruction.  Recommend follow-up.    No adjacent fat mild Tracy appendiceal enhancement.  No appendiceal enlargement.  No adjacent fat stranding    All CT scans at this facility use dose modulation, iterative reconstruction, and/or weight based dosing when appropriate to reduce radiation dose to as low as reasonably achievable.    Electronically signed by: Armin Goldsmith  Date:    07/21/2024  Time:    22:33    Lab Results   Component Value Date    WBC 10.14 07/21/2024    HGB 14.3 07/21/2024    HCT 43.9 07/21/2024    MCV 86 07/21/2024     07/21/2024           Assessment and Plan     1. Status post laparoscopic hysterectomy           Plan:  Status post laparoscopic hysterectomy  -     Pt overall doing well and improving.   Pathology benign.  Follow up with scheduled post-op visit.      Follow up in about 2 weeks (around 8/6/2024).

## 2024-08-01 ENCOUNTER — NURSE TRIAGE (OUTPATIENT)
Dept: ADMINISTRATIVE | Facility: CLINIC | Age: 39
End: 2024-08-01
Payer: COMMERCIAL

## 2024-08-02 NOTE — TELEPHONE ENCOUNTER
Caller states that she is having heavy vaginal bleeding s/p hysterectomy x 2 weeks ago. Caller states she is currently bleeding through 2 pads an hours and ABD pain.  Pt advised per protocol and verbalized understanding.      Reason for Disposition   SEVERE abdominal pain    Additional Information   Negative: Shock suspected (e.g., cold/pale/clammy skin, too weak to stand, low BP, rapid pulse)   Negative: Difficult to awaken or acting confused (e.g., disoriented, slurred speech)   Negative: Passed out (e.g., fainted, lost consciousness, blacked out and was not responding)   Negative: Sounds like a life-threatening emergency to the triager   Negative: SEVERE dizziness (e.g., unable to stand, requires support to walk, feels like passing out now)    Protocols used: Vaginal Bleeding - [id number]

## 2024-08-06 ENCOUNTER — OFFICE VISIT (OUTPATIENT)
Dept: OBSTETRICS AND GYNECOLOGY | Facility: CLINIC | Age: 39
End: 2024-08-06
Payer: COMMERCIAL

## 2024-08-06 VITALS
HEIGHT: 65 IN | DIASTOLIC BLOOD PRESSURE: 80 MMHG | SYSTOLIC BLOOD PRESSURE: 111 MMHG | WEIGHT: 258.81 LBS | BODY MASS INDEX: 43.12 KG/M2

## 2024-08-06 DIAGNOSIS — Z90.710 STATUS POST LAPAROSCOPIC HYSTERECTOMY: Primary | ICD-10-CM

## 2024-08-06 PROBLEM — N92.0 MENORRHAGIA WITH REGULAR CYCLE: Status: RESOLVED | Noted: 2024-07-15 | Resolved: 2024-08-06

## 2024-08-06 PROCEDURE — 1159F MED LIST DOCD IN RCRD: CPT | Mod: CPTII,S$GLB,, | Performed by: OBSTETRICS & GYNECOLOGY

## 2024-08-06 PROCEDURE — 1160F RVW MEDS BY RX/DR IN RCRD: CPT | Mod: CPTII,S$GLB,, | Performed by: OBSTETRICS & GYNECOLOGY

## 2024-08-06 PROCEDURE — 99024 POSTOP FOLLOW-UP VISIT: CPT | Mod: S$GLB,,, | Performed by: OBSTETRICS & GYNECOLOGY

## 2024-08-06 PROCEDURE — 3079F DIAST BP 80-89 MM HG: CPT | Mod: CPTII,S$GLB,, | Performed by: OBSTETRICS & GYNECOLOGY

## 2024-08-06 PROCEDURE — 3074F SYST BP LT 130 MM HG: CPT | Mod: CPTII,S$GLB,, | Performed by: OBSTETRICS & GYNECOLOGY

## 2024-08-06 PROCEDURE — 99999 PR PBB SHADOW E&M-EST. PATIENT-LVL V: CPT | Mod: PBBFAC,,, | Performed by: OBSTETRICS & GYNECOLOGY

## 2024-08-06 PROCEDURE — 3044F HG A1C LEVEL LT 7.0%: CPT | Mod: CPTII,S$GLB,, | Performed by: OBSTETRICS & GYNECOLOGY

## 2024-08-15 ENCOUNTER — OFFICE VISIT (OUTPATIENT)
Dept: OBSTETRICS AND GYNECOLOGY | Facility: CLINIC | Age: 39
End: 2024-08-15
Payer: COMMERCIAL

## 2024-08-15 VITALS
WEIGHT: 257.25 LBS | HEIGHT: 65 IN | SYSTOLIC BLOOD PRESSURE: 92 MMHG | BODY MASS INDEX: 42.86 KG/M2 | DIASTOLIC BLOOD PRESSURE: 60 MMHG

## 2024-08-15 DIAGNOSIS — Z90.710 STATUS POST LAPAROSCOPIC HYSTERECTOMY: Primary | ICD-10-CM

## 2024-08-15 PROCEDURE — 3078F DIAST BP <80 MM HG: CPT | Mod: CPTII,S$GLB,, | Performed by: OBSTETRICS & GYNECOLOGY

## 2024-08-15 PROCEDURE — 3044F HG A1C LEVEL LT 7.0%: CPT | Mod: CPTII,S$GLB,, | Performed by: OBSTETRICS & GYNECOLOGY

## 2024-08-15 PROCEDURE — 1159F MED LIST DOCD IN RCRD: CPT | Mod: CPTII,S$GLB,, | Performed by: OBSTETRICS & GYNECOLOGY

## 2024-08-15 PROCEDURE — 1160F RVW MEDS BY RX/DR IN RCRD: CPT | Mod: CPTII,S$GLB,, | Performed by: OBSTETRICS & GYNECOLOGY

## 2024-08-15 PROCEDURE — 99024 POSTOP FOLLOW-UP VISIT: CPT | Mod: S$GLB,,, | Performed by: OBSTETRICS & GYNECOLOGY

## 2024-08-15 PROCEDURE — 99999 PR PBB SHADOW E&M-EST. PATIENT-LVL V: CPT | Mod: PBBFAC,,, | Performed by: OBSTETRICS & GYNECOLOGY

## 2024-08-15 PROCEDURE — 3074F SYST BP LT 130 MM HG: CPT | Mod: CPTII,S$GLB,, | Performed by: OBSTETRICS & GYNECOLOGY

## 2024-08-15 NOTE — PROGRESS NOTES
Subjective     Patient ID: Leticia Hyde is a 39 y.o. female.    Chief Complaint:  Post-op Evaluation      History of Present Illness  HPI  Pt is s/p RALH/Bilateral Salpingectomy on 07/15/2024.  Pt was admitted to [facility] on [facility] secondary to sudden onset and heavy vaginal bleeding with hemorrhagic shock.  Pt was taking Xarelto at that time.  Pt denies any precipitating trauma and reports no vaginal penetration or strenuous activity since surgery.  Pt was given 4-5 u pRBC per her recollection.  Initial CT showed a cuff hematoma and Dr. Stover took pt to OR for an EUA.  Dr. Stover reports that bleeding had stopped and noted an intact cuff with no active bleeding. Repeat CT showed evidence that hematoma was no longer present.  Pt was subsequently discharged.      Today, pt reports no active bleeding.  Is following with Hematology at [facility] and plan is to resume Xarelto in 4 weeks.  Denies pains.  Is doing well today.    GYN & OB History  Patient's last menstrual period was 06/15/2024 (exact date).   Date of Last Pap: 2024    OB History    Para Term  AB Living   4 4 4     4   SAB IAB Ectopic Multiple Live Births           4      # Outcome Date GA Lbr Bentley/2nd Weight Sex Type Anes PTL Lv   4 Term  40w0d    CS-LTranv Spinal     3 Term  40w0d    CS-Unspec Spinal     2 Term  42w0d    Vag-Spont EPI     1 Term  42w0d    Vag-Spont EPI         Review of Systems  Review of Systems   Constitutional:  Negative for activity change, appetite change, chills, fatigue, fever and unexpected weight change.   Respiratory:  Negative for shortness of breath.    Cardiovascular:  Negative for chest pain, palpitations and leg swelling.   Gastrointestinal:  Negative for abdominal pain, bloating, blood in stool, constipation, diarrhea, nausea and vomiting.   Genitourinary:  Negative for dysuria, flank pain, frequency, genital sores, hematuria, pelvic pain, urgency, vaginal bleeding, vaginal discharge, vaginal pain, urinary  incontinence, vaginal dryness and vaginal odor.   Musculoskeletal:  Negative for back pain.   Neurological:  Negative for syncope and headaches.          Objective   Physical Exam:   Constitutional: She is oriented to person, place, and time. She appears well-developed and well-nourished. No distress.       Cardiovascular:  Normal rate, regular rhythm and normal heart sounds.             Pulmonary/Chest: Effort normal and breath sounds normal.        Abdominal: Soft. Bowel sounds are normal. She exhibits abdominal incision (all wounds healed well). She exhibits no distension and no mass. There is no abdominal tenderness. There is no rebound and no guarding. No hernia.     Genitourinary:    Vagina, right adnexa and left adnexa normal.      Pelvic exam was performed with patient supine.   There is no rash, tenderness, lesion or injury on the right labia. There is no rash, tenderness, lesion or injury on the left labia. Right adnexum displays no mass, no tenderness and no fullness. Left adnexum displays no mass, no tenderness and no fullness. No erythema, vaginal discharge, tenderness or bleeding in the vagina.    No foreign body in the vagina.      No signs of injury in the vagina.   Cervix is absent.Uterus is absent.           Musculoskeletal: Normal range of motion and moves all extremeties.       Neurological: She is alert and oriented to person, place, and time.    Skin: Skin is warm and dry.    Psychiatric: She has a normal mood and affect. Her behavior is normal. Thought content normal.            Assessment and Plan     1. Status post laparoscopic hysterectomy           Plan:  Status post laparoscopic hysterectomy  -     Pt doing well.  Pathology reviewed with pt (benign).  Pt cleared to return to most activities.  Continue with pelvic rest to complete 12 weeks Post-op.      Follow up for Annual exam.

## 2024-08-23 ENCOUNTER — TELEPHONE (OUTPATIENT)
Dept: OBSTETRICS AND GYNECOLOGY | Facility: CLINIC | Age: 39
End: 2024-08-23
Payer: COMMERCIAL

## 2024-08-23 NOTE — TELEPHONE ENCOUNTER
No call back number to follow up with message, unsure what they are requesting     Celine GONZALEZ LPN  OB/GYN

## 2024-08-23 NOTE — TELEPHONE ENCOUNTER
----- Message from Soraya Franks sent at 8/22/2024  4:08 PM CDT -----  Contact: farrah stage  Type:  Needs Medical Advice    Who Called: farrah stage   Symptoms (please be specific): requesting diagnosis of hysterectomy , versify prior treatment s   Would the patient rather a call back or a response via [facility]? Call back   Best Call Back Number:   Additional Information: ([id number]) case# [id number]

## 2024-09-09 ENCOUNTER — TELEPHONE (OUTPATIENT)
Dept: OBSTETRICS AND GYNECOLOGY | Facility: CLINIC | Age: 39
End: 2024-09-09
Payer: COMMERCIAL

## 2024-09-09 NOTE — TELEPHONE ENCOUNTER
Returned call to pt regarding FMLA needing to be faxed.     Advised pt that I faxed completed FMLA paperwork this morning and received a successfully sent email.     Pt states that she would call the facility to ensure the arrival of documents.    Voiced understanding.     ----- Message from Jeannie Ignacio sent at 9/9/2024 11:05 AM CDT -----  Contact: Leticia Monaco is calling to receive a call back at [phone]. Reports dropping off paperwork last week regarding returning to work. Employer hasn't heard from [employer], and pt is needing a status updated.

## 2024-09-09 NOTE — TELEPHONE ENCOUNTER
----- Message from Sumi Matthew sent at 9/9/2024 12:00 PM CDT -----  Type:  Needs Medical Advice    Who Called: Leticia Hyde,    Symptoms (please be specific): -   How long has patient had these symptoms:  -  Pharmacy name and phone #:  -  Would the patient rather a call back or a response via [facility]?    Best Call Back Number: [phone]    Additional Information: pt lela w/ De[employer] this morning  would like you to send [place] paperwork to this  fax # [phone] ( please call to advise when sent)

## 2024-09-09 NOTE — TELEPHONE ENCOUNTER
FMLA forms Faxed to [facility]      Celine GONZALEZ LPN  OB/GYN

## (undated) DEVICE — SUT MCRYL PLUS 4-0 PS2 27IN

## (undated) DEVICE — SET PNEUMOCLEAR HEAT HUM SE HF

## (undated) DEVICE — NDL PNEUMO INSUFFLATI 120MM

## (undated) DEVICE — COVER PROXIMA MAYO STAND

## (undated) DEVICE — SOL NACL IRR 1000ML BTL

## (undated) DEVICE — SOL NORMAL USPCA 0.9%

## (undated) DEVICE — SEAL UNIVERSAL 5MM-8MM XI

## (undated) DEVICE — COVER LIGHT HANDLE 80/CA

## (undated) DEVICE — SUT VICRYL PLUS 0 CT1 36IN

## (undated) DEVICE — GOWN NONREINF SET-IN SLV 2XL

## (undated) DEVICE — TIP RUMI GREEN DISPOSABLE6.7MM

## (undated) DEVICE — SUT ABS CLIP LAPRA-TY CTD

## (undated) DEVICE — DRAPE COLUMN DAVINCI XI

## (undated) DEVICE — DRAPE UINDERBUT GRAD PCH

## (undated) DEVICE — TRAY CATH 1-LYR URIMTR 16FR

## (undated) DEVICE — DRAPE STERI LONG

## (undated) DEVICE — SOL ELECTROLUBE ANTI-STIC

## (undated) DEVICE — KIT ANTIFOG W/SPONG & FLUID

## (undated) DEVICE — OBTURATOR BLADELESS 8MM XI CLR

## (undated) DEVICE — DRAPE LAVH SURG 109X109X100IN

## (undated) DEVICE — GLOVE SENSICARE PI GRN 8

## (undated) DEVICE — TRAY SKIN SCRUB WET 4 COMPART

## (undated) DEVICE — GLOVE SIGNATURE ESSNTL LTX 6

## (undated) DEVICE — HEADREST ROUND DISP FOAM 9IN

## (undated) DEVICE — PACK BASIC SETUP SC BR

## (undated) DEVICE — COVER TIP CURVED SCISSORS XI

## (undated) DEVICE — OCCLUDER COLPO-PNEUMO STERILE

## (undated) DEVICE — ELECTRODE REM PLYHSV RETURN 9

## (undated) DEVICE — SYR 50CC LL

## (undated) DEVICE — SYR LUER-LOCK STERILE 3ML

## (undated) DEVICE — DRAPE THREE-QTR REINF 53X77IN

## (undated) DEVICE — APPLICATOR CHLORAPREP ORN 26ML

## (undated) DEVICE — SYR LUER LOCK STERILE 10ML

## (undated) DEVICE — GOWN POLY REINF BRTH SLV XL

## (undated) DEVICE — DRAPE ARM DAVINCI XI

## (undated) DEVICE — ADHESIVE DERMABOND ADVANCED

## (undated) DEVICE — TOWEL OR DISP STRL BLUE 4/PK